# Patient Record
Sex: MALE | Race: OTHER | Employment: FULL TIME | URBAN - METROPOLITAN AREA
[De-identification: names, ages, dates, MRNs, and addresses within clinical notes are randomized per-mention and may not be internally consistent; named-entity substitution may affect disease eponyms.]

---

## 2017-05-17 RX ORDER — AMLODIPINE BESYLATE AND BENAZEPRIL HYDROCHLORIDE 5; 20 MG/1; MG/1
CAPSULE ORAL
Qty: 90 CAPSULE | Refills: 0 | Status: SHIPPED | OUTPATIENT
Start: 2017-05-17 | End: 2017-08-09

## 2017-06-19 NOTE — TELEPHONE ENCOUNTER
Patient called and stated need a new refill request  Patient is currently out of medication  Requesting a call back from nurse-    Current Outpatient Prescriptions:  AMLODIPINE BESY-BENAZEPRIL HCL 5-20 MG Oral Cap TAKE ONE CAPSULE BY MOUTH ONCE DAILY Disp:

## 2017-06-21 RX ORDER — AMLODIPINE BESYLATE AND BENAZEPRIL HYDROCHLORIDE 5; 20 MG/1; MG/1
CAPSULE ORAL
Qty: 90 CAPSULE | Refills: 1 | OUTPATIENT
Start: 2017-06-21

## 2017-06-21 NOTE — TELEPHONE ENCOUNTER
Refilled on 5/17/17 #90-picked up today. Pt contacted requested a one year supply, pended for 6 months due to annual in November. Pt will get blood work done soon has new insurance.     Hypertensive Medications  Protocol Criteria:  · Appointment scheduled

## 2017-07-26 ENCOUNTER — APPOINTMENT (OUTPATIENT)
Dept: LAB | Age: 62
End: 2017-07-26
Attending: INTERNAL MEDICINE
Payer: COMMERCIAL

## 2017-07-26 DIAGNOSIS — I10 ESSENTIAL HYPERTENSION: ICD-10-CM

## 2017-07-26 DIAGNOSIS — Z12.5 SCREENING FOR PROSTATE CANCER: ICD-10-CM

## 2017-07-26 LAB
ALBUMIN SERPL BCP-MCNC: 4.1 G/DL (ref 3.5–4.8)
ALBUMIN/GLOB SERPL: 1.6 {RATIO} (ref 1–2)
ALP SERPL-CCNC: 89 U/L (ref 32–100)
ALT SERPL-CCNC: 20 U/L (ref 17–63)
ANION GAP SERPL CALC-SCNC: 7 MMOL/L (ref 0–18)
AST SERPL-CCNC: 17 U/L (ref 15–41)
BILIRUB SERPL-MCNC: 0.5 MG/DL (ref 0.3–1.2)
BUN SERPL-MCNC: 18 MG/DL (ref 8–20)
BUN/CREAT SERPL: 17 (ref 10–20)
CALCIUM SERPL-MCNC: 9.4 MG/DL (ref 8.5–10.5)
CHLORIDE SERPL-SCNC: 107 MMOL/L (ref 95–110)
CHOLEST SERPL-MCNC: 198 MG/DL (ref 110–200)
CO2 SERPL-SCNC: 26 MMOL/L (ref 22–32)
CREAT SERPL-MCNC: 1.06 MG/DL (ref 0.5–1.5)
GLOBULIN PLAS-MCNC: 2.5 G/DL (ref 2.5–3.7)
GLUCOSE SERPL-MCNC: 109 MG/DL (ref 70–99)
HDLC SERPL-MCNC: 35 MG/DL
LDLC SERPL CALC-MCNC: 142 MG/DL (ref 0–99)
NONHDLC SERPL-MCNC: 163 MG/DL
OSMOLALITY UR CALC.SUM OF ELEC: 292 MOSM/KG (ref 275–295)
POTASSIUM SERPL-SCNC: 4.2 MMOL/L (ref 3.3–5.1)
PROT SERPL-MCNC: 6.6 G/DL (ref 5.9–8.4)
PSA SERPL-MCNC: 3.9 NG/ML (ref 0–4)
SODIUM SERPL-SCNC: 140 MMOL/L (ref 136–144)
TRIGL SERPL-MCNC: 105 MG/DL (ref 1–149)

## 2017-07-26 PROCEDURE — 80053 COMPREHEN METABOLIC PANEL: CPT

## 2017-07-26 PROCEDURE — 80061 LIPID PANEL: CPT

## 2017-07-26 PROCEDURE — 36415 COLL VENOUS BLD VENIPUNCTURE: CPT

## 2017-08-09 ENCOUNTER — TELEPHONE (OUTPATIENT)
Dept: INTERNAL MEDICINE CLINIC | Facility: CLINIC | Age: 62
End: 2017-08-09

## 2017-08-09 DIAGNOSIS — E78.5 HYPERLIPIDEMIA, UNSPECIFIED HYPERLIPIDEMIA TYPE: Primary | ICD-10-CM

## 2017-08-09 RX ORDER — AMLODIPINE BESYLATE AND BENAZEPRIL HYDROCHLORIDE 5; 20 MG/1; MG/1
CAPSULE ORAL
Qty: 90 CAPSULE | Refills: 0 | Status: SHIPPED | OUTPATIENT
Start: 2017-08-09 | End: 2017-11-06

## 2017-08-09 NOTE — TELEPHONE ENCOUNTER
Hypertensive Medications  Protocol Criteria:  · Appointment scheduled in the past 6 months or in the next 3 months  · BMP or CMP in the past 12 months  · Creatinine result < 2  Recent Outpatient Visits            8 months ago Essential hypertension    [facility]

## 2017-08-09 NOTE — TELEPHONE ENCOUNTER
----- Message from Maryann Calvert MD sent at 7/31/2017  6:00 PM CDT -----  Lipid profile mildly /moderately elevated. Suggest following stricter low-cholesterol diet and regular exercise. Recheck labs in 6 months.

## 2017-08-09 NOTE — TELEPHONE ENCOUNTER
Verified name and . Results/recommendations reviewed with pt and pt verb understanding            Bookmark Copy       ----- Message from Carol Mendoza MD sent at 2017  6:00 PM CDT -----  Lipid profile mildly /moderately elevated.   Suggest followi

## 2017-08-21 RX ORDER — AMLODIPINE BESYLATE AND BENAZEPRIL HYDROCHLORIDE 5; 20 MG/1; MG/1
CAPSULE ORAL
Qty: 90 CAPSULE | Refills: 0 | Status: SHIPPED | OUTPATIENT
Start: 2017-08-21 | End: 2017-11-06

## 2017-11-06 ENCOUNTER — OFFICE VISIT (OUTPATIENT)
Dept: INTERNAL MEDICINE CLINIC | Facility: CLINIC | Age: 62
End: 2017-11-06

## 2017-11-06 VITALS
HEART RATE: 84 BPM | SYSTOLIC BLOOD PRESSURE: 157 MMHG | WEIGHT: 222.13 LBS | DIASTOLIC BLOOD PRESSURE: 92 MMHG | BODY MASS INDEX: 35 KG/M2

## 2017-11-06 DIAGNOSIS — I10 ESSENTIAL HYPERTENSION: Primary | ICD-10-CM

## 2017-11-06 DIAGNOSIS — E78.5 HYPERLIPIDEMIA, UNSPECIFIED HYPERLIPIDEMIA TYPE: ICD-10-CM

## 2017-11-06 PROCEDURE — 99214 OFFICE O/P EST MOD 30 MIN: CPT | Performed by: INTERNAL MEDICINE

## 2017-11-06 PROCEDURE — 99212 OFFICE O/P EST SF 10 MIN: CPT | Performed by: INTERNAL MEDICINE

## 2017-11-06 RX ORDER — AMLODIPINE BESYLATE AND BENAZEPRIL HYDROCHLORIDE 5; 20 MG/1; MG/1
CAPSULE ORAL
Qty: 90 CAPSULE | Refills: 1 | Status: SHIPPED | OUTPATIENT
Start: 2017-11-06 | End: 2018-04-27

## 2017-11-06 NOTE — PROGRESS NOTES
HPI:    Patient ID: Jesus Matthews is a 63year old male. Hypertension   This is a chronic problem. The current episode started more than 1 year ago. The problem has been waxing and waning since onset. The problem is uncontrolled (BP on arrival 157/92.  Pt Oral Cap TAKE ONE CAPSULE BY MOUTH ONCE DAILY Disp: 90 capsule Rfl: 0     Allergies:No Known Allergies   PHYSICAL EXAM:   Physical Exam   Constitutional: He appears well-developed and well-nourished. No distress.    HENT:   Head: Normocephalic and atraumati PANEL      Orders Placed This Encounter      Lipid Panel [E]      Comp Metabolic Panel (14) [E]    Meds This Visit:  No prescriptions requested or ordered in this encounter    Imaging & Referrals:  None       Id#[id number]  By signing my name below, IDavid

## 2018-02-08 ENCOUNTER — OFFICE VISIT (OUTPATIENT)
Dept: PODIATRY CLINIC | Facility: CLINIC | Age: 63
End: 2018-02-08

## 2018-02-08 DIAGNOSIS — M72.2 PLANTAR FASCIITIS OF LEFT FOOT: Primary | ICD-10-CM

## 2018-02-08 PROCEDURE — 99212 OFFICE O/P EST SF 10 MIN: CPT | Performed by: PODIATRIST

## 2018-02-08 PROCEDURE — L3060 FOOT ARCH SUPP LONGITUD/META: HCPCS | Performed by: PODIATRIST

## 2018-02-08 PROCEDURE — 99243 OFF/OP CNSLTJ NEW/EST LOW 30: CPT | Performed by: PODIATRIST

## 2018-02-08 NOTE — PROGRESS NOTES
HPI:    Patient ID: Cyrus Llanos is a 63year old male. HPI  This pleasant 73-year-old male presents as a new patient to me on consult from . Patient's chief complaint is left arch pain. The pain is been present for the last 5-6 months.   The pa defined types were placed in this encounter.       Meds This Visit:  No prescriptions requested or ordered in this encounter    Imaging & Referrals:  None       #[id number]

## 2018-04-27 RX ORDER — AMLODIPINE BESYLATE AND BENAZEPRIL HYDROCHLORIDE 5; 20 MG/1; MG/1
CAPSULE ORAL
Qty: 90 CAPSULE | Refills: 0 | Status: SHIPPED | OUTPATIENT
Start: 2018-04-27 | End: 2018-08-13

## 2018-04-27 NOTE — TELEPHONE ENCOUNTER
Hypertensive Medications Protocol Passed4/27 3:45 PM   CMP or BMP in past 12 months    Serum Creatinine < 2.0    Appointment in past 6 or next 3 months     Medication refilled for 90 days per protocol.     Hypertensive Medications  Protocol Criteria:  · Reji

## 2018-07-23 RX ORDER — AMLODIPINE BESYLATE AND BENAZEPRIL HYDROCHLORIDE 5; 20 MG/1; MG/1
CAPSULE ORAL
Qty: 90 CAPSULE | Refills: 0 | OUTPATIENT
Start: 2018-07-23

## 2018-08-02 ENCOUNTER — OFFICE VISIT (OUTPATIENT)
Dept: INTERNAL MEDICINE CLINIC | Facility: CLINIC | Age: 63
End: 2018-08-02
Payer: COMMERCIAL

## 2018-08-02 VITALS
BODY MASS INDEX: 34 KG/M2 | DIASTOLIC BLOOD PRESSURE: 96 MMHG | HEART RATE: 84 BPM | SYSTOLIC BLOOD PRESSURE: 153 MMHG | WEIGHT: 218.81 LBS | TEMPERATURE: 98 F

## 2018-08-02 DIAGNOSIS — Z13.29 SCREENING FOR THYROID DISORDER: ICD-10-CM

## 2018-08-02 DIAGNOSIS — Z12.5 SCREENING FOR MALIGNANT NEOPLASM OF PROSTATE: ICD-10-CM

## 2018-08-02 DIAGNOSIS — I10 ESSENTIAL HYPERTENSION: Primary | ICD-10-CM

## 2018-08-02 DIAGNOSIS — Z13.220 SCREENING, LIPID: ICD-10-CM

## 2018-08-02 PROCEDURE — 99214 OFFICE O/P EST MOD 30 MIN: CPT | Performed by: INTERNAL MEDICINE

## 2018-08-02 PROCEDURE — 99212 OFFICE O/P EST SF 10 MIN: CPT | Performed by: INTERNAL MEDICINE

## 2018-08-02 NOTE — PROGRESS NOTES
HPI:    Patient ID: Bibi Watson is a 63year old male. Hypertension   This is a chronic problem. The current episode started more than 1 year ago. The problem has been gradually worsening since onset.  Condition status: Pt reports his systolic blood pre Constitutional: He is oriented to person, place, and time. He appears well-developed and well-nourished. No distress. HENT:   Head: Normocephalic and atraumatic. Eyes: EOM are normal.   Neck: Normal range of motion.    Cardiovascular: Normal rate, reg documentation has been prepared under the direction and in the presence of MAYI Arnold MD.   Electronically Signed: Daiana Burns, 8/2/2018, 11:18 AM.    MAYI AVILEZ MD,  personally performed the services described in this documentation.  Al

## 2018-08-13 NOTE — TELEPHONE ENCOUNTER
Pt called in to follow up on medication request and remembers during his OV Dr. Kristen Kennedy stated he was going to send a script. Pt went to the pharmacy and they did not have it for him. Please advise, Pt states that he is out of the medication now.

## 2018-08-14 RX ORDER — AMLODIPINE BESYLATE AND BENAZEPRIL HYDROCHLORIDE 5; 20 MG/1; MG/1
CAPSULE ORAL
Qty: 90 CAPSULE | Refills: 0 | Status: SHIPPED | OUTPATIENT
Start: 2018-08-14 | End: 2018-10-30

## 2018-09-10 ENCOUNTER — OFFICE VISIT (OUTPATIENT)
Dept: INTERNAL MEDICINE CLINIC | Facility: CLINIC | Age: 63
End: 2018-09-10
Payer: COMMERCIAL

## 2018-09-10 VITALS
DIASTOLIC BLOOD PRESSURE: 84 MMHG | HEIGHT: 66 IN | HEART RATE: 79 BPM | TEMPERATURE: 97 F | WEIGHT: 219.19 LBS | BODY MASS INDEX: 35.23 KG/M2 | SYSTOLIC BLOOD PRESSURE: 148 MMHG

## 2018-09-10 DIAGNOSIS — I10 ESSENTIAL HYPERTENSION: Primary | ICD-10-CM

## 2018-09-10 PROCEDURE — 99212 OFFICE O/P EST SF 10 MIN: CPT | Performed by: INTERNAL MEDICINE

## 2018-09-10 PROCEDURE — 99214 OFFICE O/P EST MOD 30 MIN: CPT | Performed by: INTERNAL MEDICINE

## 2018-09-10 NOTE — PROGRESS NOTES
HPI:    Patient ID: Rhea Castellanos is a 63year old male. Hypertension   This is a chronic problem. The current episode started more than 1 year ago. The problem has been waxing and waning since onset. The problem is controlled (on exam, /84).  Roseanne PHYSICAL EXAM:   Physical Exam   Constitutional: He is oriented to person, place, and time. He appears well-developed and well-nourished. No distress. HENT:   Head: Normocephalic and atraumatic. Eyes: Pupils are equal, round, and reactive to light. documentation. All medical record entries made by the scribe were at my direction and in my presence.   I have reviewed the chart and discharge instructions (if applicable) and agree that the record reflects my personal performance and is accurate and compl Justina Britton D.O.   [address] [address] SÁNCHEZ Castillo N.P. Sondra Regan, M.D.          [place] [id number].

## 2018-10-30 RX ORDER — AMLODIPINE BESYLATE AND BENAZEPRIL HYDROCHLORIDE 5; 20 MG/1; MG/1
CAPSULE ORAL
Qty: 90 CAPSULE | Refills: 0 | Status: SHIPPED | OUTPATIENT
Start: 2018-10-30 | End: 2019-01-19

## 2018-11-14 ENCOUNTER — APPOINTMENT (OUTPATIENT)
Dept: LAB | Age: 63
End: 2018-11-14
Attending: INTERNAL MEDICINE
Payer: COMMERCIAL

## 2018-11-14 DIAGNOSIS — I10 ESSENTIAL HYPERTENSION: ICD-10-CM

## 2018-11-14 DIAGNOSIS — Z12.5 SCREENING FOR MALIGNANT NEOPLASM OF PROSTATE: ICD-10-CM

## 2018-11-14 DIAGNOSIS — Z13.29 SCREENING FOR THYROID DISORDER: ICD-10-CM

## 2018-11-14 DIAGNOSIS — Z13.220 SCREENING, LIPID: ICD-10-CM

## 2018-11-14 PROCEDURE — 84443 ASSAY THYROID STIM HORMONE: CPT

## 2018-11-14 PROCEDURE — 80053 COMPREHEN METABOLIC PANEL: CPT

## 2018-11-14 PROCEDURE — 36415 COLL VENOUS BLD VENIPUNCTURE: CPT

## 2018-11-14 PROCEDURE — 80061 LIPID PANEL: CPT

## 2019-01-15 ENCOUNTER — OFFICE VISIT (OUTPATIENT)
Dept: INTERNAL MEDICINE CLINIC | Facility: CLINIC | Age: 64
End: 2019-01-15
Payer: COMMERCIAL

## 2019-01-15 ENCOUNTER — NURSE TRIAGE (OUTPATIENT)
Dept: OTHER | Age: 64
End: 2019-01-15

## 2019-01-15 VITALS
SYSTOLIC BLOOD PRESSURE: 134 MMHG | WEIGHT: 222.38 LBS | DIASTOLIC BLOOD PRESSURE: 80 MMHG | HEIGHT: 68 IN | HEART RATE: 101 BPM | BODY MASS INDEX: 33.7 KG/M2 | TEMPERATURE: 99 F

## 2019-01-15 DIAGNOSIS — J06.9 VIRAL UPPER RESPIRATORY TRACT INFECTION: Primary | ICD-10-CM

## 2019-01-15 PROCEDURE — 99212 OFFICE O/P EST SF 10 MIN: CPT | Performed by: INTERNAL MEDICINE

## 2019-01-15 PROCEDURE — 99214 OFFICE O/P EST MOD 30 MIN: CPT | Performed by: INTERNAL MEDICINE

## 2019-01-15 RX ORDER — FLUTICASONE PROPIONATE 50 MCG
2 SPRAY, SUSPENSION (ML) NASAL DAILY
Qty: 1 BOTTLE | Refills: 0 | Status: SHIPPED | OUTPATIENT
Start: 2019-01-15 | End: 2019-02-08

## 2019-01-15 NOTE — TELEPHONE ENCOUNTER
Action Requested: Summary for Provider     []  Critical Lab, Recommendations Needed  [] Need Additional Advice  []   FYI    []   Need Orders  [] Need Medications Sent to Pharmacy  []  Other     SUMMARY: Pt stated that over the weekend he was having a lot o

## 2019-01-15 NOTE — PROGRESS NOTES
HPI:    Patient ID: Hadley Ramos is a 66year old male. Sinusitis   This is a new problem. The current episode started in the past 7 days (3 dyas). The problem is unchanged. There has been no fever. He is experiencing no pain.  Associated symptoms includ normal and breath sounds normal. No respiratory distress. He has no wheezes. He has no rales. Lymphadenopathy:     He has no cervical adenopathy. Neurological: He is alert. Skin: No rash noted. He is not diaphoretic.               ASSESSMENT/PLAN:   (

## 2019-01-19 RX ORDER — AMLODIPINE BESYLATE AND BENAZEPRIL HYDROCHLORIDE 5; 20 MG/1; MG/1
CAPSULE ORAL
Qty: 90 CAPSULE | Refills: 0 | Status: SHIPPED | OUTPATIENT
Start: 2019-01-19 | End: 2019-03-28

## 2019-02-08 RX ORDER — FLUTICASONE PROPIONATE 50 MCG
SPRAY, SUSPENSION (ML) NASAL
Qty: 3 BOTTLE | Refills: 0 | Status: SHIPPED | OUTPATIENT
Start: 2019-02-08 | End: 2019-03-28 | Stop reason: ALTCHOICE

## 2019-03-28 ENCOUNTER — OFFICE VISIT (OUTPATIENT)
Dept: INTERNAL MEDICINE CLINIC | Facility: CLINIC | Age: 64
End: 2019-03-28
Payer: COMMERCIAL

## 2019-03-28 ENCOUNTER — NURSE TRIAGE (OUTPATIENT)
Dept: OTHER | Age: 64
End: 2019-03-28

## 2019-03-28 VITALS
DIASTOLIC BLOOD PRESSURE: 92 MMHG | SYSTOLIC BLOOD PRESSURE: 145 MMHG | WEIGHT: 215.13 LBS | HEART RATE: 92 BPM | BODY MASS INDEX: 33 KG/M2 | TEMPERATURE: 98 F

## 2019-03-28 DIAGNOSIS — I10 ESSENTIAL HYPERTENSION: ICD-10-CM

## 2019-03-28 DIAGNOSIS — K52.9 ACUTE GASTROENTERITIS: Primary | ICD-10-CM

## 2019-03-28 PROCEDURE — 99214 OFFICE O/P EST MOD 30 MIN: CPT | Performed by: INTERNAL MEDICINE

## 2019-03-28 PROCEDURE — 99212 OFFICE O/P EST SF 10 MIN: CPT | Performed by: INTERNAL MEDICINE

## 2019-03-28 RX ORDER — AMLODIPINE BESYLATE AND BENAZEPRIL HYDROCHLORIDE 5; 20 MG/1; MG/1
1 CAPSULE ORAL
Qty: 90 CAPSULE | Refills: 0 | Status: SHIPPED | OUTPATIENT
Start: 2019-03-28 | End: 2019-12-26

## 2019-03-28 NOTE — PROGRESS NOTES
HPI:    Patient ID: Dmitri Pereira is a 66year old male. Patient presents with:  Diarrhea: c/o chronic diarrhea x 3 days    HPI  Diarrhea  Patient c/o watery diarrhea, frequent BM (every 2-3 hours during daytime/nighttime), and abdominal pain.  This is a ne Diagnosis Date   • Essential hypertension       History reviewed. No pertinent surgical history. History reviewed. No pertinent family history.    Social History    Tobacco Use      Smoking status: Light Tobacco Smoker        Types: Cigars      Smokeles reviewed. 03/28/19  1518   BP: (!) 145/92   Pulse: 92   Temp: 98.4 °F (36.9 °C)   TempSrc: Tympanic   Weight: 215 lb 1.6 oz (97.6 kg)     Body mass index is 32.71 kg/m².   Diabetes:  No results found for: A1C, EAG  Lab Results   Component Value Date Besy-Benazepril HCl 5-20 MG Oral Cap 90 capsule 0     Sig: Take 1 capsule by mouth once daily.        Imaging & Referrals:  None       ID#[id number]  By signing my name below, Armida Travis,  attest that this documentation has been prepared under the direct

## 2019-03-28 NOTE — TELEPHONE ENCOUNTER
Action Requested: Summary for Provider     []  Critical Lab, Recommendations Needed  [] Need Additional Advice  []   FYI    []   Need Orders  [] Need Medications Sent to Pharmacy  []  Other     SUMMARY: patient called stating,\"I went out to dinner on Sund

## 2019-04-18 RX ORDER — AMLODIPINE BESYLATE AND BENAZEPRIL HYDROCHLORIDE 5; 20 MG/1; MG/1
CAPSULE ORAL
Qty: 90 CAPSULE | Refills: 0 | Status: SHIPPED | OUTPATIENT
Start: 2019-04-18 | End: 2019-07-19

## 2019-07-19 RX ORDER — AMLODIPINE BESYLATE AND BENAZEPRIL HYDROCHLORIDE 5; 20 MG/1; MG/1
1 CAPSULE ORAL
Qty: 90 CAPSULE | Refills: 1 | Status: SHIPPED | OUTPATIENT
Start: 2019-07-19 | End: 2019-11-26

## 2019-07-19 NOTE — TELEPHONE ENCOUNTER
Refill passed per [facility] protocol.   Hypertensive Medications  Protocol Criteria:  · Appointment scheduled in the past 6 months or in the next 3 months  · BMP or CMP in the past 12 months  · Creatinine result < 2  Recent Outpatient Visits

## 2019-07-19 NOTE — TELEPHONE ENCOUNTER
Pharmacy called to request refill for     Current Outpatient Medications:  AMLODIPINE BESY-BENAZEPRIL HCL 5-20 MG Oral Cap TAKE 1 CAPSULE BY MOUTH EVERY DAY Disp: 90 capsule Rfl: 0

## 2019-11-26 ENCOUNTER — OFFICE VISIT (OUTPATIENT)
Dept: INTERNAL MEDICINE CLINIC | Facility: CLINIC | Age: 64
End: 2019-11-26
Payer: COMMERCIAL

## 2019-11-26 VITALS
HEIGHT: 66 IN | RESPIRATION RATE: 12 BRPM | HEART RATE: 89 BPM | DIASTOLIC BLOOD PRESSURE: 85 MMHG | TEMPERATURE: 98 F | WEIGHT: 222 LBS | SYSTOLIC BLOOD PRESSURE: 172 MMHG | BODY MASS INDEX: 35.68 KG/M2

## 2019-11-26 DIAGNOSIS — I10 ESSENTIAL HYPERTENSION: ICD-10-CM

## 2019-11-26 DIAGNOSIS — R01.1 HEART MURMUR: ICD-10-CM

## 2019-11-26 DIAGNOSIS — Z12.11 COLON CANCER SCREENING: ICD-10-CM

## 2019-11-26 DIAGNOSIS — Z00.00 ANNUAL PHYSICAL EXAM: Primary | ICD-10-CM

## 2019-11-26 PROBLEM — J06.9 VIRAL UPPER RESPIRATORY TRACT INFECTION: Status: RESOLVED | Noted: 2019-01-15 | Resolved: 2019-11-26

## 2019-11-26 PROCEDURE — 99396 PREV VISIT EST AGE 40-64: CPT | Performed by: INTERNAL MEDICINE

## 2019-11-26 RX ORDER — HYDROCHLOROTHIAZIDE 12.5 MG/1
12.5 TABLET ORAL DAILY
Qty: 90 TABLET | Refills: 0 | Status: SHIPPED | OUTPATIENT
Start: 2019-11-26

## 2019-11-26 RX ORDER — AMLODIPINE BESYLATE AND BENAZEPRIL HYDROCHLORIDE 5; 20 MG/1; MG/1
1 CAPSULE ORAL
Qty: 90 CAPSULE | Refills: 1 | Status: SHIPPED | OUTPATIENT
Start: 2019-11-26 | End: 2020-05-30

## 2019-11-26 NOTE — PROGRESS NOTES
History of Present Illness   Patient ID: Jesus Matthews is a 66year old male.   Chief Complaint: Establish Care (Former Dr. Helen Hall patient. ) and HTN (f/u HTN)      Patient presents today to get established with me as his PCP since Dr Tariq Baker PCP had r HENT:   Right Ear: Tympanic membrane and external ear normal.   Left Ear: Tympanic membrane and external ear normal.   Nose: Nose normal.   Mouth/Throat: Oropharynx is clear and moist. No oropharyngeal exudate.    Eyes: Pupils are equal, round, and reacti CHOLEST 223 (H) 11/14/2018    TRIG 158 (H) 11/14/2018    HDL 38 11/14/2018     (H) 11/14/2018    NONHDLC 185 (H) 11/14/2018     The 10-year ASCVD risk score (Sage Clancy, et al., 2013) is: 23.1%    Values used to calculate the score:      Age: 66 Colon cancer screening  Plan: GASTRO - INTERNAL       Pt overdue for colon screening; he opted for colonoscopy so referred to gastro    (I10) Essential hypertension  Plan: bp had been elevated inspite of being on lotrel 5/20mg daily.  I told him will [facility].

## 2019-12-23 ENCOUNTER — OFFICE VISIT (OUTPATIENT)
Dept: INTERNAL MEDICINE CLINIC | Facility: CLINIC | Age: 64
End: 2019-12-23
Payer: COMMERCIAL

## 2019-12-23 ENCOUNTER — OFFICE VISIT (OUTPATIENT)
Dept: OTOLARYNGOLOGY | Facility: CLINIC | Age: 64
End: 2019-12-23
Payer: COMMERCIAL

## 2019-12-23 ENCOUNTER — NURSE TRIAGE (OUTPATIENT)
Dept: INTERNAL MEDICINE CLINIC | Facility: CLINIC | Age: 64
End: 2019-12-23

## 2019-12-23 VITALS — SYSTOLIC BLOOD PRESSURE: 167 MMHG | DIASTOLIC BLOOD PRESSURE: 97 MMHG | TEMPERATURE: 97 F | HEART RATE: 91 BPM

## 2019-12-23 VITALS
HEART RATE: 98 BPM | SYSTOLIC BLOOD PRESSURE: 180 MMHG | WEIGHT: 215 LBS | HEIGHT: 66 IN | DIASTOLIC BLOOD PRESSURE: 102 MMHG | BODY MASS INDEX: 34.55 KG/M2

## 2019-12-23 DIAGNOSIS — H92.02 ACUTE EAR PAIN, LEFT: ICD-10-CM

## 2019-12-23 DIAGNOSIS — H61.23 BILATERAL IMPACTED CERUMEN: Primary | ICD-10-CM

## 2019-12-23 DIAGNOSIS — H60.332 ACUTE SWIMMER'S EAR OF LEFT SIDE: ICD-10-CM

## 2019-12-23 DIAGNOSIS — H61.22 IMPACTED CERUMEN OF LEFT EAR: Primary | ICD-10-CM

## 2019-12-23 PROCEDURE — 99243 OFF/OP CNSLTJ NEW/EST LOW 30: CPT | Performed by: OTOLARYNGOLOGY

## 2019-12-23 PROCEDURE — 69210 REMOVE IMPACTED EAR WAX UNI: CPT | Performed by: OTOLARYNGOLOGY

## 2019-12-23 PROCEDURE — 69209 REMOVE IMPACTED EAR WAX UNI: CPT | Performed by: INTERNAL MEDICINE

## 2019-12-23 PROCEDURE — 99213 OFFICE O/P EST LOW 20 MIN: CPT | Performed by: INTERNAL MEDICINE

## 2019-12-23 RX ORDER — NEOMYCIN SULFATE, POLYMYXIN B SULFATE AND HYDROCORTISONE 10; 3.5; 1 MG/ML; MG/ML; [USP'U]/ML
3 SUSPENSION/ DROPS AURICULAR (OTIC) 3 TIMES DAILY
Qty: 10 ML | Refills: 1 | Status: SHIPPED | OUTPATIENT
Start: 2019-12-23 | End: 2019-12-26

## 2019-12-23 RX ORDER — AMOXICILLIN AND CLAVULANATE POTASSIUM 875; 125 MG/1; MG/1
1 TABLET, FILM COATED ORAL 2 TIMES DAILY
Qty: 20 TABLET | Refills: 0 | Status: SHIPPED | OUTPATIENT
Start: 2019-12-23 | End: 2020-01-02

## 2019-12-23 NOTE — PROGRESS NOTES
Yoel Steward is a 60year old male. Patient presents with:  Ear Wax: Left ear \"clogged\", pt bought ear waxing kit, nothing came out. Pt placed cotton ball in ear and started bleeding minimally.  Pt went to PCP who tried to remove ear wax, and nothing came Attends Yarsani service: Not on file        Active member of club or organization: Not on file        Attends meetings of clubs or organizations: Not on file        Relationship status: Not on file      Intimate partner violence:        Fear of current o No suspicious lesions bruises or masses.    Constitutional Normal Overall appearance - Normal.   Head/Face Normal Facial features - Normal. Eyebrows - Normal. Skull - Normal.   Nasopharynx Normal External nose - Normal.  .   Neurological Normal Memory - Nor REQUIRING INSTRUMENTATION, UNILATERAL    2. Acute swimmer's ear of left side  Pathophysiology of external otitis was discussed at length. Relevant anatomy was shown to the patient on the picture of the ear in the examination room.  I discussed the fact that

## 2019-12-23 NOTE — PROGRESS NOTES
History of Present Illness   Patient ID: Allen Siemens is a 60year old male. Chief Complaint: Ear Wax (left ear. tried OTC ear wax removal kit not helping. did see blood on cotton ball and cutip. )      Ear Wax   This is a new problem.  Episode onset: thcj Psychiatric: He has a normal mood and affect. Procedure note: left ear irrigated with warm water. Small fragments of wax removed but unable to visualize tympanic membrane with otoscope in office.      Labs & Imaging  Pertinent labs and imaging reviewe Tobacco comment: quit smoking more than 30 yrs    Alcohol use: Yes      Alcohol/week: 0.0 standard drinks      Comment: Rarely.     Drug use: No     Reviewed:    Current Outpatient Medications:   •  Amoxicillin-Pot Clavulanate 875-125 MG Oral Tab, Take 1 ta education discussed with patient as per after visit summary. Potential medication side effects discussed. All questions answered to best of ability. Call office with any questions. Seek emergency care if necessary.    Patient understands and agrees to fol

## 2019-12-23 NOTE — TELEPHONE ENCOUNTER
Action Requested: Summary for Provider     []  Critical Lab, Recommendations Needed  [] Need Additional Advice  []   FYI    []   Need Orders  [] Need Medications Sent to Pharmacy  []  Other     SUMMARY: Spoke with the patient who reports since Thursday 12/

## 2019-12-26 ENCOUNTER — OFFICE VISIT (OUTPATIENT)
Dept: OTOLARYNGOLOGY | Facility: CLINIC | Age: 64
End: 2019-12-26
Payer: COMMERCIAL

## 2019-12-26 VITALS
WEIGHT: 220 LBS | BODY MASS INDEX: 34.53 KG/M2 | DIASTOLIC BLOOD PRESSURE: 96 MMHG | TEMPERATURE: 98 F | SYSTOLIC BLOOD PRESSURE: 168 MMHG | HEIGHT: 67 IN

## 2019-12-26 DIAGNOSIS — H60.332 ACUTE SWIMMER'S EAR OF LEFT SIDE: Primary | ICD-10-CM

## 2019-12-26 PROCEDURE — 99213 OFFICE O/P EST LOW 20 MIN: CPT | Performed by: OTOLARYNGOLOGY

## 2019-12-26 PROCEDURE — 92504 EAR MICROSCOPY EXAMINATION: CPT | Performed by: OTOLARYNGOLOGY

## 2019-12-26 RX ORDER — NEOMYCIN SULFATE, POLYMYXIN B SULFATE AND HYDROCORTISONE 10; 3.5; 1 MG/ML; MG/ML; [USP'U]/ML
3 SUSPENSION/ DROPS AURICULAR (OTIC) DAILY
Qty: 1 BOTTLE | Refills: 1 | Status: SHIPPED | OUTPATIENT
Start: 2019-12-26 | End: 2020-01-05

## 2019-12-26 NOTE — PROGRESS NOTES
Dmitri Pereira is a 60year old male. Patient presents with: Follow - Up: regarding acute swimmer's ear of left side, c/o clogged left ear       HISTORY OF PRESENT ILLNESS  12/23/2019  Patient  presents with ear pain in the left ears for4 days.   He felt lashay Attends Buddhism service: Not on file        Active member of club or organization: Not on file        Attends meetings of clubs or organizations: Not on file        Relationship status: Not on file      Intimate partner violence:        Fear of curr Normal Inspection - Normal. No suspicious lesions bruises or masses. Constitutional Normal Overall appearance - Normal.   Head/Face Normal Facial features - Normal. Eyebrows - Normal. Skull - Normal.   Nasopharynx Normal External nose - Normal.  .    Ileana Barrett patient on the picture of the ear in the examination room. I discussed the fact that usually this is related to water exposure of the external auditory canal followed by bacterial overgrowth. I emphasized the need to keep water out of the ear.   An otowick

## 2019-12-28 ENCOUNTER — OFFICE VISIT (OUTPATIENT)
Dept: OTOLARYNGOLOGY | Facility: CLINIC | Age: 64
End: 2019-12-28
Payer: COMMERCIAL

## 2019-12-28 ENCOUNTER — TELEPHONE (OUTPATIENT)
Dept: OTOLARYNGOLOGY | Facility: CLINIC | Age: 64
End: 2019-12-28

## 2019-12-28 VITALS
SYSTOLIC BLOOD PRESSURE: 169 MMHG | BODY MASS INDEX: 34.53 KG/M2 | TEMPERATURE: 98 F | WEIGHT: 220 LBS | HEIGHT: 67 IN | DIASTOLIC BLOOD PRESSURE: 93 MMHG

## 2019-12-28 DIAGNOSIS — H62.40 FUNGAL OTITIS EXTERNA: Primary | ICD-10-CM

## 2019-12-28 DIAGNOSIS — B36.9 FUNGAL OTITIS EXTERNA: Primary | ICD-10-CM

## 2019-12-28 PROCEDURE — 92504 EAR MICROSCOPY EXAMINATION: CPT | Performed by: OTOLARYNGOLOGY

## 2019-12-28 PROCEDURE — 99213 OFFICE O/P EST LOW 20 MIN: CPT | Performed by: OTOLARYNGOLOGY

## 2019-12-28 RX ORDER — CLOTRIMAZOLE 1 G/ML
SOLUTION TOPICAL
Qty: 1 BOTTLE | Refills: 0 | Status: SHIPPED | OUTPATIENT
Start: 2019-12-28 | End: 2020-01-27

## 2019-12-28 NOTE — TELEPHONE ENCOUNTER
Per Dr. Lyric Schneider, ok to have pt come in today. Spoke with pt who states he will come in 10:30-10:45.

## 2019-12-28 NOTE — TELEPHONE ENCOUNTER
Pt. states that cotton that was inserted into his L ear has not fallen out, and it is recessing into his ear. Pt. States that Dr Naresh Siddiqui inserted the cotton on 12/26/19. Pt wants to know what should he do?

## 2019-12-28 NOTE — TELEPHONE ENCOUNTER
Dr. Justina Christopher please advise,   Saeed Perkins placed in left ear by Dr. Karyle Alice 12/26/19. Pt using ear drops and was feeling better, but pt states the otowick is hurting badly and not falling out, it's going deeper into ear and feeling like it's compressing.  Pt state

## 2019-12-28 NOTE — PROGRESS NOTES
Moses Royal is a 60year old male.   Patient presents with:  Ear Problem: pt states left ear throbbing pain on and off, left ear feels clogged      HISTORY OF PRESENT ILLNESS    Seen by Dr. Shook Second occasions for acute otitis externa most recently had a Hema/Lymph Negative Easy bleeding and easy bruising.            PHYSICAL EXAM    BP (!) 169/93 (BP Location: Left arm, Patient Position: Sitting, Cuff Size: large)   Temp 97.8 °F (36.6 °C) (Tympanic)   Ht 5' 7\" (1.702 m)   Wt 220 lb (99.8 kg)   BMI 34.46 Oral Tab, Take 1 tablet (12.5 mg total) by mouth daily. , Disp: 90 tablet, Rfl: 0  •  amLODIPine Besy-Benazepril HCl 5-20 MG Oral Cap, Take 1 capsule by mouth once daily. , Disp: 90 capsule, Rfl: 1  •  Neomycin-Polymyxin-HC [id number] Otic Suspension, Place

## 2020-01-02 ENCOUNTER — OFFICE VISIT (OUTPATIENT)
Dept: OTOLARYNGOLOGY | Facility: CLINIC | Age: 65
End: 2020-01-02
Payer: COMMERCIAL

## 2020-01-02 VITALS
TEMPERATURE: 98 F | HEIGHT: 67 IN | SYSTOLIC BLOOD PRESSURE: 177 MMHG | WEIGHT: 220 LBS | BODY MASS INDEX: 34.53 KG/M2 | DIASTOLIC BLOOD PRESSURE: 99 MMHG

## 2020-01-02 DIAGNOSIS — H62.40 FUNGAL OTITIS EXTERNA: Primary | ICD-10-CM

## 2020-01-02 DIAGNOSIS — B36.9 FUNGAL OTITIS EXTERNA: Primary | ICD-10-CM

## 2020-01-02 PROCEDURE — 92504 EAR MICROSCOPY EXAMINATION: CPT | Performed by: OTOLARYNGOLOGY

## 2020-01-02 PROCEDURE — 99213 OFFICE O/P EST LOW 20 MIN: CPT | Performed by: OTOLARYNGOLOGY

## 2020-01-02 RX ORDER — FLUCONAZOLE 100 MG/1
100 TABLET ORAL DAILY
Qty: 7 TABLET | Refills: 0 | Status: SHIPPED | OUTPATIENT
Start: 2020-01-02

## 2020-01-02 NOTE — PROGRESS NOTES
Rosanne Lozada is a 60year old male. Patient presents with:   Follow - Up: regarding fungal otitis externa, pt states symptoms returned 2 days ago       HISTORY OF PRESENT ILLNESS  Seen by Dr. Roxane Toth  on several occasions for acute otitis externa most recently changes. Respiratory Negative Dyspnea and wheezing. Cardio Negative Chest pain, irregular heartbeat/palpitations and syncope. GI Negative Abdominal pain and diarrhea. Endocrine Negative Cold intolerance and heat intolerance.    Neuro Negative Tremor all debris removed using suction. The findings are described in the physical exam.   All fungal debris removed from the left ear canal.  Multiple irrigations with acetic acid.     Current Outpatient Medications:   •  fluconazole 100 MG Oral Tab, Take 1 tabl

## 2020-01-04 ENCOUNTER — OFFICE VISIT (OUTPATIENT)
Dept: OTOLARYNGOLOGY | Facility: CLINIC | Age: 65
End: 2020-01-04
Payer: COMMERCIAL

## 2020-01-04 VITALS
WEIGHT: 220 LBS | HEIGHT: 67 IN | BODY MASS INDEX: 34.53 KG/M2 | TEMPERATURE: 98 F | SYSTOLIC BLOOD PRESSURE: 150 MMHG | DIASTOLIC BLOOD PRESSURE: 85 MMHG

## 2020-01-04 DIAGNOSIS — H62.40 FUNGAL OTITIS EXTERNA: Primary | ICD-10-CM

## 2020-01-04 DIAGNOSIS — B36.9 FUNGAL OTITIS EXTERNA: Primary | ICD-10-CM

## 2020-01-04 PROCEDURE — 99213 OFFICE O/P EST LOW 20 MIN: CPT | Performed by: OTOLARYNGOLOGY

## 2020-01-04 NOTE — PROGRESS NOTES
[facility] is a 60year old male. Patient presents with:   Follow - Up: regarding fungal otitis externa- per pt there is some changes/improvement of symptoms      HISTORY OF PRESENT ILLNESS  Seen by Dr. Nadeen Gavin  on several occasions for acute otitis externa Procedure Laterality Date   • COLONOSCOPY           REVIEW OF SYSTEMS    System Neg/Pos Details   Constitutional Negative Fatigue, fever and weight loss. ENMT Negative Drooling. Eyes Negative Blurred vision and vision changes.    Respiratory Negative Normal Nares - Right: Normal Left: Normal. Septum -Normal  Turbinates - Right: Normal, Left: Normal.       Current Outpatient Medications:   •  fluconazole 100 MG Oral Tab, Take 1 tablet (100 mg total) by mouth daily. , Disp: 7 tablet, Rfl: 0  •  clotrimazo

## 2020-01-14 ENCOUNTER — OFFICE VISIT (OUTPATIENT)
Dept: OTOLARYNGOLOGY | Facility: CLINIC | Age: 65
End: 2020-01-14
Payer: COMMERCIAL

## 2020-01-14 VITALS
TEMPERATURE: 98 F | HEIGHT: 67 IN | BODY MASS INDEX: 34.53 KG/M2 | SYSTOLIC BLOOD PRESSURE: 154 MMHG | WEIGHT: 220 LBS | DIASTOLIC BLOOD PRESSURE: 91 MMHG

## 2020-01-14 DIAGNOSIS — H62.40 FUNGAL OTITIS EXTERNA: Primary | ICD-10-CM

## 2020-01-14 DIAGNOSIS — B36.9 FUNGAL OTITIS EXTERNA: Primary | ICD-10-CM

## 2020-01-14 PROCEDURE — 99213 OFFICE O/P EST LOW 20 MIN: CPT | Performed by: OTOLARYNGOLOGY

## 2020-01-14 NOTE — PROGRESS NOTES
Agustín Nance is a 60year old male. Patient presents with:   Follow - Up: regarding fungal otitis externa, improvement in symptoms       HISTORY OF PRESENT ILLNESS    Seen by Adam Da Silva several occasions for acute otitis externa most recently had odette persaud Problem Relation Age of Onset   • Stroke Mother    • Heart Disorder Mother        Past Medical History:   Diagnosis Date   • Essential hypertension        Past Surgical History:   Procedure Laterality Date   • COLONOSCOPY           REVIEW OF SYSTEMS    S Supraclavicular.         Nose/Mouth/Throat Normal External nose - Normal. Lips/teeth/gums - Normal. Tonsils - Normal. Oropharynx - Normal.   Nose/Mouth/Throat Normal Nares - Right: Normal Left: Normal. Septum -Normal  Turbinates - Right: Normal, Left: Eric Garcia

## 2020-05-29 ENCOUNTER — TELEPHONE (OUTPATIENT)
Dept: INTERNAL MEDICINE CLINIC | Facility: CLINIC | Age: 65
End: 2020-05-29

## 2020-05-29 NOTE — TELEPHONE ENCOUNTER
Patient is calling to let the doctor know the reason he has not got his labs done is because of the virus. He plans on scheduling something end of July.

## 2020-05-30 RX ORDER — AMLODIPINE BESYLATE AND BENAZEPRIL HYDROCHLORIDE 5; 20 MG/1; MG/1
CAPSULE ORAL
Qty: 90 CAPSULE | Refills: 0 | Status: SHIPPED | OUTPATIENT
Start: 2020-05-30 | End: 2020-07-13

## 2020-06-05 NOTE — TELEPHONE ENCOUNTER
RODO patient and related and related Dr. Natalia Goff message that patient that is due for f/u HTN.  Appointment scheduled for 7/14/2020

## 2020-07-13 ENCOUNTER — OFFICE VISIT (OUTPATIENT)
Dept: INTERNAL MEDICINE CLINIC | Facility: CLINIC | Age: 65
End: 2020-07-13
Payer: COMMERCIAL

## 2020-07-13 VITALS
SYSTOLIC BLOOD PRESSURE: 153 MMHG | WEIGHT: 198 LBS | TEMPERATURE: 98 F | BODY MASS INDEX: 31.08 KG/M2 | HEIGHT: 67 IN | HEART RATE: 86 BPM | RESPIRATION RATE: 12 BRPM | DIASTOLIC BLOOD PRESSURE: 97 MMHG

## 2020-07-13 DIAGNOSIS — R01.1 HEART MURMUR: ICD-10-CM

## 2020-07-13 DIAGNOSIS — I10 ESSENTIAL HYPERTENSION: Primary | ICD-10-CM

## 2020-07-13 DIAGNOSIS — E78.00 HYPERCHOLESTEREMIA: ICD-10-CM

## 2020-07-13 DIAGNOSIS — Z12.11 COLON CANCER SCREENING: ICD-10-CM

## 2020-07-13 PROCEDURE — 99214 OFFICE O/P EST MOD 30 MIN: CPT | Performed by: INTERNAL MEDICINE

## 2020-07-13 RX ORDER — AMLODIPINE BESYLATE AND BENAZEPRIL HYDROCHLORIDE 5; 20 MG/1; MG/1
1 CAPSULE ORAL DAILY
Qty: 90 CAPSULE | Refills: 1 | Status: SHIPPED | OUTPATIENT
Start: 2020-07-13 | End: 2020-08-26

## 2020-07-13 NOTE — PROGRESS NOTES
HPI:    Patient ID: Sujatha Sharpe is a 60year old male. Hypertension   This is a chronic problem. The current episode started more than 1 year ago. The problem has been waxing and waning since onset. The problem is uncontrolled.  Pertinent negatives incl Tab Take 1 tablet (12.5 mg total) by mouth daily. (Patient not taking: Reported on 7/13/2020 ) 90 tablet 0     Allergies:No Known Allergies   PHYSICAL EXAM:   Physical Exam   Constitutional: He is oriented to person, place, and time.  He appears well-develo types were placed in this encounter.       Meds This Visit:  Requested Prescriptions      No prescriptions requested or ordered in this encounter       Imaging & Referrals:  None       #[id number]

## 2020-07-14 ENCOUNTER — OFFICE VISIT (OUTPATIENT)
Dept: OTOLARYNGOLOGY | Facility: CLINIC | Age: 65
End: 2020-07-14
Payer: COMMERCIAL

## 2020-07-14 VITALS
HEIGHT: 67 IN | SYSTOLIC BLOOD PRESSURE: 146 MMHG | WEIGHT: 198 LBS | BODY MASS INDEX: 31.08 KG/M2 | HEART RATE: 75 BPM | DIASTOLIC BLOOD PRESSURE: 86 MMHG

## 2020-07-14 DIAGNOSIS — H61.23 BILATERAL IMPACTED CERUMEN: Primary | ICD-10-CM

## 2020-07-14 PROCEDURE — 69210 REMOVE IMPACTED EAR WAX UNI: CPT | Performed by: OTOLARYNGOLOGY

## 2020-07-14 NOTE — PROGRESS NOTES
Andreina Rhodes is a 60year old male.   Patient presents with:  Ear Problem: f/u fungal otitis externa - per pt is not having any sympomts       HISTORY OF PRESENT ILLNESS  Seen by Gilford Spencer several occasions for acute otitis externa most recently had a wic Concerns:        Caffeine Concern: Yes          Coffee, 2 cups daily.       Family History   Problem Relation Age of Onset   • Stroke Mother    • Heart Disorder Mother        Past Medical History:   Diagnosis Date   • Essential hypertension        Past S suction. Comments: Return to clinic as needed. Avoid q-tips, water precautions and use over the counter wax remedies as needed. Current Outpatient Medications:   •  amLODIPine Besy-Benazepril HCl 5-20 MG Oral Cap, Take 1 capsule by mouth daily. Petey Loud

## 2020-08-26 ENCOUNTER — TELEPHONE (OUTPATIENT)
Dept: INTERNAL MEDICINE CLINIC | Facility: CLINIC | Age: 65
End: 2020-08-26

## 2020-08-26 RX ORDER — AMLODIPINE BESYLATE AND BENAZEPRIL HYDROCHLORIDE 5; 20 MG/1; MG/1
1 CAPSULE ORAL DAILY
Qty: 90 CAPSULE | Refills: 1 | Status: SHIPPED | OUTPATIENT
Start: 2020-08-26 | End: 2021-08-10

## 2020-08-26 NOTE — TELEPHONE ENCOUNTER
Per pharmacy pt is requesting refill for the following medication. •  amLODIPine Besy-Benazepril HCl 5-20 MG Oral Cap, Take 1 capsule by mouth daily. , Disp: 90 capsule, Rfl: 1
alert and oriented x 3

## 2021-03-11 ENCOUNTER — TELEPHONE (OUTPATIENT)
Dept: INTERNAL MEDICINE CLINIC | Facility: CLINIC | Age: 66
End: 2021-03-11

## 2021-03-11 NOTE — TELEPHONE ENCOUNTER
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 Patient would like to know if this is true and safe for him to obtain COVID vaccine.

## 2021-03-11 NOTE — TELEPHONE ENCOUNTER
There really has been no direct link of bells palsy from covid vaccines. He can also go to the [facility] website for covid vaccinations and he will be able to see it there.    Even for patients who had bells palsy before, CDC didn't not put his as a contraindicat

## 2021-03-30 ENCOUNTER — IMMUNIZATION (OUTPATIENT)
Dept: LAB | Facility: HOSPITAL | Age: 66
End: 2021-03-30
Attending: HOSPITALIST
Payer: COMMERCIAL

## 2021-03-30 DIAGNOSIS — Z23 NEED FOR VACCINATION: Primary | ICD-10-CM

## 2021-03-30 PROCEDURE — 0011A SARSCOV2 VAC 100MCG/0.5ML IM: CPT

## 2021-04-27 ENCOUNTER — IMMUNIZATION (OUTPATIENT)
Dept: LAB | Facility: HOSPITAL | Age: 66
End: 2021-04-27
Attending: EMERGENCY MEDICINE
Payer: COMMERCIAL

## 2021-04-27 DIAGNOSIS — Z23 NEED FOR VACCINATION: Primary | ICD-10-CM

## 2021-04-27 PROCEDURE — 0012A SARSCOV2 VAC 100MCG/0.5ML IM: CPT

## 2021-06-01 ENCOUNTER — OFFICE VISIT (OUTPATIENT)
Dept: OTOLARYNGOLOGY | Facility: CLINIC | Age: 66
End: 2021-06-01
Payer: COMMERCIAL

## 2021-06-01 VITALS
DIASTOLIC BLOOD PRESSURE: 79 MMHG | TEMPERATURE: 98 F | WEIGHT: 190 LBS | SYSTOLIC BLOOD PRESSURE: 142 MMHG | BODY MASS INDEX: 29.82 KG/M2 | HEIGHT: 67 IN

## 2021-06-01 DIAGNOSIS — H61.23 BILATERAL IMPACTED CERUMEN: Primary | ICD-10-CM

## 2021-06-01 PROCEDURE — 3008F BODY MASS INDEX DOCD: CPT | Performed by: OTOLARYNGOLOGY

## 2021-06-01 PROCEDURE — 3078F DIAST BP <80 MM HG: CPT | Performed by: OTOLARYNGOLOGY

## 2021-06-01 PROCEDURE — 69210 REMOVE IMPACTED EAR WAX UNI: CPT | Performed by: OTOLARYNGOLOGY

## 2021-06-01 PROCEDURE — 3077F SYST BP >= 140 MM HG: CPT | Performed by: OTOLARYNGOLOGY

## 2021-06-01 NOTE — PROGRESS NOTES
Yarelis Smith is a 63year old male. Patient presents with: Follow - Up: pt presents today for 6 month f/u due to wax on both ears and cleaning on both ears.        HISTORY OF PRESENT ILLNESS    Patient presents for cerumen removal. No other complaints or 190 lb (86.2 kg)   BMI 29.76 kg/m²        Constitutional Normal Overall appearance - Normal.        Neck Exam Normal Inspection - Normal. Palpation - Normal. Parotid gland - Normal. Thyroid gland - Normal.             Head/Face Normal Facial features - Nor

## 2021-08-10 RX ORDER — AMLODIPINE BESYLATE AND BENAZEPRIL HYDROCHLORIDE 5; 20 MG/1; MG/1
1 CAPSULE ORAL DAILY
Qty: 90 CAPSULE | Refills: 0 | Status: SHIPPED | OUTPATIENT
Start: 2021-08-10 | End: 2021-11-04

## 2021-08-10 NOTE — TELEPHONE ENCOUNTER
Current Outpatient Medications:   •  amLODIPine Besy-Benazepril HCl 5-20 MG Oral Cap, Take 1 capsule by mouth daily. , Disp: 90 capsule, Rfl: 1

## 2021-08-11 NOTE — TELEPHONE ENCOUNTER
pls call pt, pt has not been seen for more than a year.  He needs [facility] ov for me to continue to refill his med

## 2021-08-11 NOTE — TELEPHONE ENCOUNTER
Call Center, please assist patient to schedule OV for f/u HTN in order to received future med refills. See Dr. Monaco Flight message below. Thank you.

## 2021-11-04 ENCOUNTER — OFFICE VISIT (OUTPATIENT)
Dept: INTERNAL MEDICINE CLINIC | Facility: CLINIC | Age: 66
End: 2021-11-04
Payer: COMMERCIAL

## 2021-11-04 VITALS
WEIGHT: 193.38 LBS | HEART RATE: 82 BPM | HEIGHT: 67 IN | DIASTOLIC BLOOD PRESSURE: 86 MMHG | BODY MASS INDEX: 30.35 KG/M2 | TEMPERATURE: 99 F | SYSTOLIC BLOOD PRESSURE: 134 MMHG | OXYGEN SATURATION: 99 %

## 2021-11-04 DIAGNOSIS — Z12.11 COLON CANCER SCREENING: ICD-10-CM

## 2021-11-04 DIAGNOSIS — I10 PRIMARY HYPERTENSION: ICD-10-CM

## 2021-11-04 DIAGNOSIS — Z00.00 ANNUAL PHYSICAL EXAM: Primary | ICD-10-CM

## 2021-11-04 PROCEDURE — 3079F DIAST BP 80-89 MM HG: CPT | Performed by: INTERNAL MEDICINE

## 2021-11-04 PROCEDURE — 3008F BODY MASS INDEX DOCD: CPT | Performed by: INTERNAL MEDICINE

## 2021-11-04 PROCEDURE — 3075F SYST BP GE 130 - 139MM HG: CPT | Performed by: INTERNAL MEDICINE

## 2021-11-04 PROCEDURE — 99396 PREV VISIT EST AGE 40-64: CPT | Performed by: INTERNAL MEDICINE

## 2021-11-04 RX ORDER — AMLODIPINE BESYLATE AND BENAZEPRIL HYDROCHLORIDE 5; 20 MG/1; MG/1
1 CAPSULE ORAL DAILY
Qty: 90 CAPSULE | Refills: 1 | Status: SHIPPED | OUTPATIENT
Start: 2021-11-04

## 2021-11-04 NOTE — PROGRESS NOTES
Subjective:     Patient ID: Brittnee Calderon is a 63year old male. Patient presents today for annual physical and ffup for his hypertension.  He states he was not able to tolerate hydrochlorthiazide 12.5mg daily I added before to control his hypertension be well-developed. He is not ill-appearing, toxic-appearing or diaphoretic. HENT:      Head: Normocephalic and atraumatic.       Right Ear: Tympanic membrane, ear canal and external ear normal.      Left Ear: Tympanic membrane, ear canal and external ear nor hypertension  Plan: bp still mildly elevated with current bp meds but he has not been able to tolerate additional bp med. We agreed he will do work on losing more weight which will also help lower his bp. We will have him RTC in 3mos for recheck of his bp.

## 2021-11-05 RX ORDER — AMLODIPINE BESYLATE AND BENAZEPRIL HYDROCHLORIDE 5; 20 MG/1; MG/1
1 CAPSULE ORAL DAILY
Qty: 90 CAPSULE | Refills: 1 | OUTPATIENT
Start: 2021-11-05

## 2021-11-09 ENCOUNTER — IMMUNIZATION (OUTPATIENT)
Dept: LAB | Facility: HOSPITAL | Age: 66
End: 2021-11-09
Attending: EMERGENCY MEDICINE
Payer: COMMERCIAL

## 2021-11-09 DIAGNOSIS — Z23 NEED FOR VACCINATION: Primary | ICD-10-CM

## 2021-11-09 PROCEDURE — 0064A SARSCOV2 VAC 50MCG/0.25ML IM: CPT

## 2022-04-12 ENCOUNTER — IMMUNIZATION (OUTPATIENT)
Dept: LAB | Age: 67
End: 2022-04-12
Attending: EMERGENCY MEDICINE
Payer: COMMERCIAL

## 2022-04-12 DIAGNOSIS — Z23 NEED FOR VACCINATION: Primary | ICD-10-CM

## 2022-04-12 PROCEDURE — 0064A SARSCOV2 VAC 50MCG/0.25ML IM: CPT

## 2022-04-28 RX ORDER — AMLODIPINE BESYLATE AND BENAZEPRIL HYDROCHLORIDE 5; 20 MG/1; MG/1
1 CAPSULE ORAL DAILY
Qty: 90 CAPSULE | Refills: 0 | Status: SHIPPED | OUTPATIENT
Start: 2022-04-28

## 2022-04-28 NOTE — TELEPHONE ENCOUNTER
Please review; protocol failed/no protocol    Sent patient [employer] message to complete labs ordered at 11/04/2021 office visit    Please send, if appropriate      Requested Prescriptions   Pending Prescriptions Disp Refills    AMLODIPINE BESY-BENAZEPRIL HCL 5-20 MG Oral Cap [Pharmacy Med Name: AMLODIPINE-BENAZ 5/20MG CAPSULES] 90 capsule 1     Sig: Take 1 capsule by mouth daily.         Hypertensive Medications Protocol Failed - 4/27/2022 12:09 PM        Failed - CMP or BMP in past 12 months        Passed - Appointment in past 6 or next 3 months        Passed - GFR Non- > 50     Lab Results   Component Value Date    GFRNAA >60 11/14/2018                        Recent Outpatient Visits              5 months ago Annual physical exam    [address] Butcher MD    Office Visit    11 months ago Bilateral impacted cerumen    [facility], [address], Lyudmila Ames MD    Office Visit    1 year ago Bilateral impacted cerumen    [facility], [address], Lyudmila Ames MD    Office Visit    1 year ago Essential hypertension    [address], [address], Lucía Villegas MD    Office Visit    2 years ago Fungal otitis externa    [facility], [address], Lyudmila Ames MD    Office Visit             Future Appointments         Provider Department Appt Notes    In 4 weeks Tatiana Araiza MD [facility], [address], [address] [facility]

## 2022-05-26 ENCOUNTER — OFFICE VISIT (OUTPATIENT)
Dept: OTOLARYNGOLOGY | Facility: CLINIC | Age: 67
End: 2022-05-26
Payer: COMMERCIAL

## 2022-05-26 VITALS — WEIGHT: 190 LBS | HEIGHT: 69 IN | TEMPERATURE: 98 F | BODY MASS INDEX: 28.14 KG/M2

## 2022-05-26 DIAGNOSIS — H61.23 BILATERAL IMPACTED CERUMEN: Primary | ICD-10-CM

## 2022-05-26 PROCEDURE — 69210 REMOVE IMPACTED EAR WAX UNI: CPT | Performed by: OTOLARYNGOLOGY

## 2022-05-26 PROCEDURE — 3008F BODY MASS INDEX DOCD: CPT | Performed by: OTOLARYNGOLOGY

## 2022-06-22 ENCOUNTER — LAB ENCOUNTER (OUTPATIENT)
Dept: LAB | Age: 67
End: 2022-06-22
Attending: INTERNAL MEDICINE
Payer: COMMERCIAL

## 2022-06-22 DIAGNOSIS — Z00.00 ANNUAL PHYSICAL EXAM: ICD-10-CM

## 2022-06-22 LAB
ALBUMIN SERPL-MCNC: 3.8 G/DL (ref 3.4–5)
ALBUMIN/GLOB SERPL: 1.2 {RATIO} (ref 1–2)
ALP LIVER SERPL-CCNC: 77 U/L
ALT SERPL-CCNC: 30 U/L
ANION GAP SERPL CALC-SCNC: 7 MMOL/L (ref 0–18)
AST SERPL-CCNC: 19 U/L (ref 15–37)
BASOPHILS # BLD AUTO: 0.05 X10(3) UL (ref 0–0.2)
BASOPHILS NFR BLD AUTO: 0.7 %
BILIRUB SERPL-MCNC: 0.5 MG/DL (ref 0.1–2)
BUN BLD-MCNC: 19 MG/DL (ref 7–18)
BUN/CREAT SERPL: 18.3 (ref 10–20)
CALCIUM BLD-MCNC: 9.9 MG/DL (ref 8.5–10.1)
CHLORIDE SERPL-SCNC: 107 MMOL/L (ref 98–112)
CHOLEST SERPL-MCNC: 214 MG/DL (ref ?–200)
CO2 SERPL-SCNC: 27 MMOL/L (ref 21–32)
COMPLEXED PSA SERPL-MCNC: 5.61 NG/ML (ref ?–4)
CREAT BLD-MCNC: 1.04 MG/DL
DEPRECATED RDW RBC AUTO: 42.5 FL (ref 35.1–46.3)
EOSINOPHIL # BLD AUTO: 0.1 X10(3) UL (ref 0–0.7)
EOSINOPHIL NFR BLD AUTO: 1.3 %
ERYTHROCYTE [DISTWIDTH] IN BLOOD BY AUTOMATED COUNT: 12.3 % (ref 11–15)
FASTING PATIENT LIPID ANSWER: YES
FASTING STATUS PATIENT QL REPORTED: YES
GLOBULIN PLAS-MCNC: 3.2 G/DL (ref 2.8–4.4)
GLUCOSE BLD-MCNC: 100 MG/DL (ref 70–99)
HCT VFR BLD AUTO: 45.9 %
HDLC SERPL-MCNC: 40 MG/DL (ref 40–59)
HGB BLD-MCNC: 15.3 G/DL
IMM GRANULOCYTES # BLD AUTO: 0.03 X10(3) UL (ref 0–1)
IMM GRANULOCYTES NFR BLD: 0.4 %
LDLC SERPL CALC-MCNC: 153 MG/DL (ref ?–100)
LYMPHOCYTES # BLD AUTO: 1.75 X10(3) UL (ref 1–4)
LYMPHOCYTES NFR BLD AUTO: 23.3 %
MCH RBC QN AUTO: 31.2 PG (ref 26–34)
MCHC RBC AUTO-ENTMCNC: 33.3 G/DL (ref 31–37)
MCV RBC AUTO: 93.5 FL
MONOCYTES # BLD AUTO: 0.75 X10(3) UL (ref 0.1–1)
MONOCYTES NFR BLD AUTO: 10 %
NEUTROPHILS # BLD AUTO: 4.82 X10 (3) UL (ref 1.5–7.7)
NEUTROPHILS # BLD AUTO: 4.82 X10(3) UL (ref 1.5–7.7)
NEUTROPHILS NFR BLD AUTO: 64.3 %
NONHDLC SERPL-MCNC: 174 MG/DL (ref ?–130)
OSMOLALITY SERPL CALC.SUM OF ELEC: 294 MOSM/KG (ref 275–295)
PLATELET # BLD AUTO: 133 10(3)UL (ref 150–450)
POTASSIUM SERPL-SCNC: 4.6 MMOL/L (ref 3.5–5.1)
PROT SERPL-MCNC: 7 G/DL (ref 6.4–8.2)
RBC # BLD AUTO: 4.91 X10(6)UL
SODIUM SERPL-SCNC: 141 MMOL/L (ref 136–145)
TRIGL SERPL-MCNC: 115 MG/DL (ref 30–149)
VLDLC SERPL CALC-MCNC: 22 MG/DL (ref 0–30)
WBC # BLD AUTO: 7.5 X10(3) UL (ref 4–11)

## 2022-06-22 PROCEDURE — 80053 COMPREHEN METABOLIC PANEL: CPT

## 2022-06-22 PROCEDURE — 85025 COMPLETE CBC W/AUTO DIFF WBC: CPT

## 2022-06-22 PROCEDURE — 36415 COLL VENOUS BLD VENIPUNCTURE: CPT

## 2022-06-22 PROCEDURE — 80061 LIPID PANEL: CPT

## 2022-06-23 ENCOUNTER — TELEPHONE (OUTPATIENT)
Dept: INTERNAL MEDICINE CLINIC | Facility: CLINIC | Age: 67
End: 2022-06-23

## 2022-06-23 DIAGNOSIS — R97.20 ELEVATED PSA: Primary | ICD-10-CM

## 2022-06-27 ENCOUNTER — OFFICE VISIT (OUTPATIENT)
Dept: INTERNAL MEDICINE CLINIC | Facility: CLINIC | Age: 67
End: 2022-06-27
Payer: COMMERCIAL

## 2022-06-27 VITALS
SYSTOLIC BLOOD PRESSURE: 146 MMHG | DIASTOLIC BLOOD PRESSURE: 86 MMHG | HEIGHT: 67 IN | WEIGHT: 201.13 LBS | BODY MASS INDEX: 31.57 KG/M2 | HEART RATE: 82 BPM

## 2022-06-27 DIAGNOSIS — Z12.11 COLON CANCER SCREENING: ICD-10-CM

## 2022-06-27 DIAGNOSIS — H02.9 LESION OF RIGHT UPPER EYELID: ICD-10-CM

## 2022-06-27 DIAGNOSIS — D69.6 THROMBOCYTOPENIA (HCC): ICD-10-CM

## 2022-06-27 DIAGNOSIS — E78.00 HYPERCHOLESTEREMIA: ICD-10-CM

## 2022-06-27 DIAGNOSIS — R97.20 ELEVATED PSA: ICD-10-CM

## 2022-06-27 DIAGNOSIS — I10 PRIMARY HYPERTENSION: Primary | ICD-10-CM

## 2022-06-27 PROCEDURE — 3008F BODY MASS INDEX DOCD: CPT | Performed by: INTERNAL MEDICINE

## 2022-06-27 PROCEDURE — 99214 OFFICE O/P EST MOD 30 MIN: CPT | Performed by: INTERNAL MEDICINE

## 2022-06-27 PROCEDURE — 3079F DIAST BP 80-89 MM HG: CPT | Performed by: INTERNAL MEDICINE

## 2022-06-27 PROCEDURE — 3077F SYST BP >= 140 MM HG: CPT | Performed by: INTERNAL MEDICINE

## 2022-06-27 RX ORDER — AMLODIPINE BESYLATE AND BENAZEPRIL HYDROCHLORIDE 5; 20 MG/1; MG/1
1 CAPSULE ORAL DAILY
Qty: 30 CAPSULE | Refills: 0 | Status: SHIPPED | OUTPATIENT
Start: 2022-06-27 | End: 2022-06-27

## 2022-06-27 RX ORDER — COVID-19 MOLECULAR TEST ASSAY
KIT MISCELLANEOUS
COMMUNITY
Start: 2022-04-30

## 2022-06-27 RX ORDER — AMLODIPINE BESYLATE AND BENAZEPRIL HYDROCHLORIDE 5; 40 MG/1; MG/1
1 CAPSULE ORAL DAILY
Qty: 30 CAPSULE | Refills: 0 | Status: SHIPPED | OUTPATIENT
Start: 2022-06-27

## 2022-06-28 ENCOUNTER — TELEPHONE (OUTPATIENT)
Dept: OPHTHALMOLOGY | Facility: CLINIC | Age: 67
End: 2022-06-28

## 2022-06-28 NOTE — TELEPHONE ENCOUNTER
Patient referred by his pcp to be seen for new consult for lesion on right eye. Patient informed no openings until November, requesting appointment sooner. Please call at [phone],HERVE.

## 2022-07-07 ENCOUNTER — LAB ENCOUNTER (OUTPATIENT)
Dept: LAB | Facility: HOSPITAL | Age: 67
End: 2022-07-07
Attending: UROLOGY
Payer: COMMERCIAL

## 2022-07-07 ENCOUNTER — OFFICE VISIT (OUTPATIENT)
Dept: SURGERY | Facility: CLINIC | Age: 67
End: 2022-07-07
Payer: COMMERCIAL

## 2022-07-07 VITALS — DIASTOLIC BLOOD PRESSURE: 98 MMHG | SYSTOLIC BLOOD PRESSURE: 170 MMHG | HEART RATE: 85 BPM

## 2022-07-07 DIAGNOSIS — N13.8 BPH WITH OBSTRUCTION/LOWER URINARY TRACT SYMPTOMS: ICD-10-CM

## 2022-07-07 DIAGNOSIS — N40.1 BPH WITH OBSTRUCTION/LOWER URINARY TRACT SYMPTOMS: ICD-10-CM

## 2022-07-07 DIAGNOSIS — N40.1 BPH WITH OBSTRUCTION/LOWER URINARY TRACT SYMPTOMS: Primary | ICD-10-CM

## 2022-07-07 DIAGNOSIS — N13.8 BPH WITH OBSTRUCTION/LOWER URINARY TRACT SYMPTOMS: Primary | ICD-10-CM

## 2022-07-07 DIAGNOSIS — R97.20 ELEVATED PSA: ICD-10-CM

## 2022-07-07 LAB
BILIRUB UR QL: NEGATIVE
CLARITY UR: CLEAR
COLOR UR: YELLOW
GLUCOSE UR-MCNC: NEGATIVE MG/DL
HGB UR QL STRIP.AUTO: NEGATIVE
KETONES UR-MCNC: NEGATIVE MG/DL
LEUKOCYTE ESTERASE UR QL STRIP.AUTO: NEGATIVE
NITRITE UR QL STRIP.AUTO: NEGATIVE
PH UR: 6.5 [PH] (ref 5–8)
PROT UR-MCNC: NEGATIVE MG/DL
SP GR UR STRIP: 1.02 (ref 1–1.03)
UROBILINOGEN UR STRIP-ACNC: 0.2

## 2022-07-07 PROCEDURE — 3080F DIAST BP >= 90 MM HG: CPT | Performed by: UROLOGY

## 2022-07-07 PROCEDURE — 81003 URINALYSIS AUTO W/O SCOPE: CPT

## 2022-07-07 PROCEDURE — 99244 OFF/OP CNSLTJ NEW/EST MOD 40: CPT | Performed by: UROLOGY

## 2022-07-07 PROCEDURE — 3077F SYST BP >= 140 MM HG: CPT | Performed by: UROLOGY

## 2022-07-07 RX ORDER — DUTASTERIDE 0.5 MG/1
0.5 CAPSULE, LIQUID FILLED ORAL DAILY
Qty: 90 CAPSULE | Refills: 3 | Status: SHIPPED | OUTPATIENT
Start: 2022-07-07

## 2022-07-07 RX ORDER — TAMSULOSIN HYDROCHLORIDE 0.4 MG/1
0.4 CAPSULE ORAL DAILY
Qty: 90 CAPSULE | Refills: 3 | Status: SHIPPED | OUTPATIENT
Start: 2022-07-07 | End: 2022-07-07

## 2022-07-07 RX ORDER — TADALAFIL 5 MG/1
5 TABLET ORAL
Qty: 90 TABLET | Refills: 3 | Status: SHIPPED | OUTPATIENT
Start: 2022-07-07

## 2022-07-12 ENCOUNTER — TELEPHONE (OUTPATIENT)
Dept: SURGERY | Facility: CLINIC | Age: 67
End: 2022-07-12

## 2022-07-12 NOTE — TELEPHONE ENCOUNTER
Per pt does not want to take Tadalafil and Dutasteride stating it may cause prostate cancer. Per pt he wants to try something else.  Please advise

## 2022-07-13 NOTE — TELEPHONE ENCOUNTER
Spoke with pt and notified him of [facility] response below that he does not believe dutasteride causes cancer. Pt verbalized understanding but is still concerned with adverse reactions listed with medication. I explained this has been a controversial issue as to whether the medication has caused prostate cancer versus whether patients who are on these medications already have a higher risk of prostate cancer and that it is controversial whether medication has a causal or correlational relationship. Pt verbalized understanding and states he will discuss this with his primary care doctor as well and will update us with further decisions.

## 2022-07-13 NOTE — TELEPHONE ENCOUNTER
I do not believe that Dutasteride causes prostate cancer and I believe it is safe to use. However if he remains concerned, Im ok with him trying Tadalafil 5 mg PO every day (no risk of cancer) and followup with me as planned.   Thanks

## 2022-07-14 ENCOUNTER — LAB ENCOUNTER (OUTPATIENT)
Dept: LAB | Age: 67
End: 2022-07-14
Attending: INTERNAL MEDICINE
Payer: COMMERCIAL

## 2022-07-14 DIAGNOSIS — I10 PRIMARY HYPERTENSION: ICD-10-CM

## 2022-07-14 DIAGNOSIS — D69.6 THROMBOCYTOPENIA (HCC): ICD-10-CM

## 2022-07-14 LAB
ANION GAP SERPL CALC-SCNC: 4 MMOL/L (ref 0–18)
BUN BLD-MCNC: 20 MG/DL (ref 7–18)
BUN/CREAT SERPL: 19.2 (ref 10–20)
CALCIUM BLD-MCNC: 9.6 MG/DL (ref 8.5–10.1)
CHLORIDE SERPL-SCNC: 105 MMOL/L (ref 98–112)
CO2 SERPL-SCNC: 30 MMOL/L (ref 21–32)
CREAT BLD-MCNC: 1.04 MG/DL
FASTING STATUS PATIENT QL REPORTED: NO
GLUCOSE BLD-MCNC: 110 MG/DL (ref 70–99)
OSMOLALITY SERPL CALC.SUM OF ELEC: 291 MOSM/KG (ref 275–295)
PLATELET # BLD AUTO: 126 10(3)UL (ref 150–450)
POTASSIUM SERPL-SCNC: 4.3 MMOL/L (ref 3.5–5.1)
SODIUM SERPL-SCNC: 139 MMOL/L (ref 136–145)

## 2022-07-14 PROCEDURE — 85049 AUTOMATED PLATELET COUNT: CPT

## 2022-07-14 PROCEDURE — 82607 VITAMIN B-12: CPT | Performed by: INTERNAL MEDICINE

## 2022-07-14 PROCEDURE — 36415 COLL VENOUS BLD VENIPUNCTURE: CPT

## 2022-07-14 PROCEDURE — 85060 BLOOD SMEAR INTERPRETATION: CPT

## 2022-07-14 PROCEDURE — 80048 BASIC METABOLIC PNL TOTAL CA: CPT

## 2022-07-16 ENCOUNTER — TELEPHONE (OUTPATIENT)
Dept: INTERNAL MEDICINE CLINIC | Facility: CLINIC | Age: 67
End: 2022-07-16

## 2022-07-16 DIAGNOSIS — D69.6 THROMBOCYTOPENIA (HCC): ICD-10-CM

## 2022-07-16 DIAGNOSIS — R97.20 ELEVATED PSA: Primary | ICD-10-CM

## 2022-07-16 LAB — VIT B12 SERPL-MCNC: 418 PG/ML (ref 193–986)

## 2022-07-20 ENCOUNTER — OFFICE VISIT (OUTPATIENT)
Dept: OPHTHALMOLOGY | Facility: CLINIC | Age: 67
End: 2022-07-20
Payer: COMMERCIAL

## 2022-07-20 DIAGNOSIS — D23.111 PAPILLOMA OF RIGHT UPPER EYELID: Primary | ICD-10-CM

## 2022-07-20 PROCEDURE — 99202 OFFICE O/P NEW SF 15 MIN: CPT | Performed by: OPHTHALMOLOGY

## 2022-07-20 NOTE — ASSESSMENT & PLAN NOTE
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 Patient denies taking any blood thinners. Discussed risks, benefits, treatment and alternatives to excision. Patient elects excision.

## 2022-07-20 NOTE — PATIENT INSTRUCTIONS
Papilloma of right upper eyelid  Discussed that patient could have the papilloma excised in the near future. Patient denies taking any blood thinners. Discussed risks, benefits, treatment and alternatives to excision. Patient elects excision.

## 2022-07-21 ENCOUNTER — OFFICE VISIT (OUTPATIENT)
Dept: INTERNAL MEDICINE CLINIC | Facility: CLINIC | Age: 67
End: 2022-07-21
Payer: COMMERCIAL

## 2022-07-21 VITALS
SYSTOLIC BLOOD PRESSURE: 146 MMHG | HEIGHT: 67 IN | HEART RATE: 84 BPM | WEIGHT: 200.31 LBS | DIASTOLIC BLOOD PRESSURE: 80 MMHG | BODY MASS INDEX: 31.44 KG/M2 | OXYGEN SATURATION: 98 % | TEMPERATURE: 98 F

## 2022-07-21 DIAGNOSIS — E78.00 HYPERCHOLESTEREMIA: ICD-10-CM

## 2022-07-21 DIAGNOSIS — D69.6 THROMBOCYTOPENIA (HCC): ICD-10-CM

## 2022-07-21 DIAGNOSIS — I10 PRIMARY HYPERTENSION: Primary | ICD-10-CM

## 2022-07-21 PROCEDURE — 99213 OFFICE O/P EST LOW 20 MIN: CPT | Performed by: INTERNAL MEDICINE

## 2022-07-21 PROCEDURE — 3077F SYST BP >= 140 MM HG: CPT | Performed by: INTERNAL MEDICINE

## 2022-07-21 PROCEDURE — 3079F DIAST BP 80-89 MM HG: CPT | Performed by: INTERNAL MEDICINE

## 2022-07-21 PROCEDURE — 3008F BODY MASS INDEX DOCD: CPT | Performed by: INTERNAL MEDICINE

## 2022-07-21 RX ORDER — AMLODIPINE BESYLATE AND BENAZEPRIL HYDROCHLORIDE 10; 40 MG/1; MG/1
1 CAPSULE ORAL DAILY
Qty: 30 CAPSULE | Refills: 1 | Status: SHIPPED | OUTPATIENT
Start: 2022-07-21 | End: 2022-07-21

## 2022-07-21 RX ORDER — AMLODIPINE BESYLATE AND BENAZEPRIL HYDROCHLORIDE 10; 40 MG/1; MG/1
1 CAPSULE ORAL DAILY
Qty: 90 CAPSULE | Refills: 0 | Status: SHIPPED | OUTPATIENT
Start: 2022-07-21

## 2022-07-28 ENCOUNTER — TELEPHONE (OUTPATIENT)
Dept: INTERNAL MEDICINE CLINIC | Facility: CLINIC | Age: 67
End: 2022-07-28

## 2022-07-28 NOTE — TELEPHONE ENCOUNTER
Rima Man then to go back on the 5/20mg dose; he can take it today if he hasnt taken any bp med today.

## 2022-07-28 NOTE — TELEPHONE ENCOUNTER
Called patient, confirmed name and . Informed of below. Patient verbalized understanding and agrees.       Future Appointments   Date Time Provider Sanjuana Owensi   8/3/2022  3:30 PM Chema Ortiz MD [facility]   2022 11:15 AM Thi Baxter MD [facility]   2022 11:30 AM Emiliano Dewitt MD [facility]   2022  1:30 PM Chema Ortiz MD [facility]   2023 10:00 AM Lucie Harris MD [address] [facility]

## 2022-07-28 NOTE — TELEPHONE ENCOUNTER
pt stated that he had been taking  AMLODIPINE BESY-BENAZEPRIL HCL 5-20 MG but his blood pressure was in the 140s so it was then increased to  AMLODIPINE BESY-BENAZEPRIL HCL 5-40 MG but this dose did not do any changes to his blood pressure so it was then increases to AMLODIPINE BESY-BENAZEPRIL HCL 10-40 MG pt stated that he has been taking this dose for the past 6 days but he feels his blood pressure is getting worse as now his blood pressure readings are 150/89 148/89 and he feels he might be having side effects from the medication be feels tired and feels nauseous. Pt will like to go back to the AMLODIPINE BESY-BENAZEPRIL HCL 5-20 MG and is it ok for him to start the dose today?  or should he wait until tomorrow. Pt has not taken any AMLODIPINE BESY-BENAZEPRIL HCL medication for today.  please advise.        Future Appointments   Date Time Provider Sanjuana Barrett          8/11/2022 11:15 AM MD TATA [place]

## 2022-08-03 ENCOUNTER — OFFICE VISIT (OUTPATIENT)
Dept: OPHTHALMOLOGY | Facility: CLINIC | Age: 67
End: 2022-08-03
Payer: COMMERCIAL

## 2022-08-03 DIAGNOSIS — D23.111 PAPILLOMA OF RIGHT UPPER EYELID: Primary | ICD-10-CM

## 2022-08-03 PROCEDURE — 11440 EXC FACE-MM B9+MARG 0.5 CM/<: CPT | Performed by: OPHTHALMOLOGY

## 2022-08-03 NOTE — PATIENT INSTRUCTIONS
Papilloma of right upper eyelid  Pt had an excision of papilloma near right  lateral canthus (right upper) today by Dr. Marisa Singh with no complications. Erythromycin applied in the office today.

## 2022-08-03 NOTE — ASSESSMENT & PLAN NOTE
Pt had an excision of papilloma near right  lateral canthus (right upper) today by Dr. Everett Luciano with no complications. Erythromycin applied in the office today.

## 2022-08-04 ENCOUNTER — MED REC SCAN ONLY (OUTPATIENT)
Dept: INTERNAL MEDICINE CLINIC | Facility: CLINIC | Age: 67
End: 2022-08-04

## 2022-08-09 ENCOUNTER — LAB ENCOUNTER (OUTPATIENT)
Dept: LAB | Age: 67
End: 2022-08-09
Attending: INTERNAL MEDICINE
Payer: COMMERCIAL

## 2022-08-09 LAB — FOLATE SERPL-MCNC: 14.2 NG/ML (ref 8.7–?)

## 2022-08-09 PROCEDURE — 36415 COLL VENOUS BLD VENIPUNCTURE: CPT | Performed by: INTERNAL MEDICINE

## 2022-08-09 PROCEDURE — 82746 ASSAY OF FOLIC ACID SERUM: CPT | Performed by: INTERNAL MEDICINE

## 2022-08-11 ENCOUNTER — OFFICE VISIT (OUTPATIENT)
Dept: INTERNAL MEDICINE CLINIC | Facility: CLINIC | Age: 67
End: 2022-08-11
Payer: COMMERCIAL

## 2022-08-11 VITALS
OXYGEN SATURATION: 98 % | TEMPERATURE: 98 F | WEIGHT: 200.5 LBS | SYSTOLIC BLOOD PRESSURE: 138 MMHG | BODY MASS INDEX: 31.47 KG/M2 | DIASTOLIC BLOOD PRESSURE: 86 MMHG | HEIGHT: 67 IN | HEART RATE: 85 BPM

## 2022-08-11 DIAGNOSIS — I10 PRIMARY HYPERTENSION: Primary | ICD-10-CM

## 2022-08-11 DIAGNOSIS — D69.6 THROMBOCYTOPENIA (HCC): ICD-10-CM

## 2022-08-11 PROCEDURE — 3075F SYST BP GE 130 - 139MM HG: CPT | Performed by: INTERNAL MEDICINE

## 2022-08-11 PROCEDURE — 99213 OFFICE O/P EST LOW 20 MIN: CPT | Performed by: INTERNAL MEDICINE

## 2022-08-11 PROCEDURE — 3079F DIAST BP 80-89 MM HG: CPT | Performed by: INTERNAL MEDICINE

## 2022-08-11 PROCEDURE — 3008F BODY MASS INDEX DOCD: CPT | Performed by: INTERNAL MEDICINE

## 2022-08-11 RX ORDER — TADALAFIL 5 MG/1
TABLET ORAL
COMMUNITY
Start: 2022-08-01

## 2022-08-11 RX ORDER — DUTASTERIDE 0.5 MG/1
CAPSULE, LIQUID FILLED ORAL
COMMUNITY
Start: 2022-08-01

## 2022-08-11 RX ORDER — AMLODIPINE BESYLATE AND BENAZEPRIL HYDROCHLORIDE 5; 20 MG/1; MG/1
1 CAPSULE ORAL DAILY
Qty: 90 CAPSULE | Refills: 1 | Status: SHIPPED | OUTPATIENT
Start: 2022-08-11

## 2022-08-22 ENCOUNTER — OFFICE VISIT (OUTPATIENT)
Dept: HEMATOLOGY/ONCOLOGY | Facility: HOSPITAL | Age: 67
End: 2022-08-22
Attending: INTERNAL MEDICINE
Payer: COMMERCIAL

## 2022-08-22 VITALS
WEIGHT: 197.63 LBS | SYSTOLIC BLOOD PRESSURE: 162 MMHG | DIASTOLIC BLOOD PRESSURE: 85 MMHG | RESPIRATION RATE: 16 BRPM | TEMPERATURE: 99 F | HEIGHT: 67 IN | BODY MASS INDEX: 31.02 KG/M2 | HEART RATE: 81 BPM | OXYGEN SATURATION: 99 %

## 2022-08-22 DIAGNOSIS — R97.20 ELEVATED PSA: ICD-10-CM

## 2022-08-22 DIAGNOSIS — D69.6 THROMBOCYTOPENIA (HCC): Primary | ICD-10-CM

## 2022-08-22 PROCEDURE — 99211 OFF/OP EST MAY X REQ PHY/QHP: CPT

## 2022-10-13 ENCOUNTER — TELEPHONE (OUTPATIENT)
Dept: INTERNAL MEDICINE CLINIC | Facility: CLINIC | Age: 67
End: 2022-10-13

## 2022-10-13 NOTE — TELEPHONE ENCOUNTER
Pt would like to know if the doctor recommends for him to get the last covid booster vaccine that just came out.

## 2022-10-13 NOTE — TELEPHONE ENCOUNTER
Called patient in regards to message below, informed can received the Covid booster ( last Covid was April )    Provided information to call Central scheduling at [phone]  or [phone] to make an appointment     Patient verbalizes understanding and agrees.

## 2022-10-17 ENCOUNTER — TELEPHONE (OUTPATIENT)
Dept: INTERNAL MEDICINE CLINIC | Facility: CLINIC | Age: 67
End: 2022-10-17

## 2022-10-17 NOTE — TELEPHONE ENCOUNTER
Pharmacy    [place] [address] #[id number]  [place] [address] [phone], [phone]       Additional Information    Associated Reports   View Encounter   Priority and Order Details     Outpatient Medication Detail     Disp Refills Start End    amLODIPine Besy-Benazepril HCl 5-20 MG Oral Cap 90 capsule 1 8/11/2022     Sig - Route:  Take 1 capsule by mouth daily. - Oral    Sent to pharmacy as: amLODIPine Besy-Benazepril HCl 5-20 MG Oral Capsule (LOTREL)    E-Prescribing Status: Receipt confirmed by pharmacy (8/11/2022 11:56 AM CDT)

## 2022-10-20 ENCOUNTER — OFFICE VISIT (OUTPATIENT)
Dept: OPHTHALMOLOGY | Facility: CLINIC | Age: 67
End: 2022-10-20
Payer: COMMERCIAL

## 2022-10-20 DIAGNOSIS — H25.13 AGE-RELATED NUCLEAR CATARACT OF BOTH EYES: Primary | ICD-10-CM

## 2022-10-20 PROBLEM — D23.111 PAPILLOMA OF RIGHT UPPER EYELID: Status: RESOLVED | Noted: 2022-07-20 | Resolved: 2022-10-20

## 2022-10-20 PROBLEM — H02.9 LESION OF RIGHT UPPER EYELID: Status: RESOLVED | Noted: 2022-06-27 | Resolved: 2022-10-20

## 2022-10-20 PROCEDURE — 92014 COMPRE OPH EXAM EST PT 1/>: CPT | Performed by: OPHTHALMOLOGY

## 2022-10-20 PROCEDURE — 92015 DETERMINE REFRACTIVE STATE: CPT | Performed by: OPHTHALMOLOGY

## 2022-10-20 NOTE — PATIENT INSTRUCTIONS
Age-related nuclear cataract of both eyes  Discussed mild cataracts in both eyes that are not affecting vision and are not surgical at this time. RX for separate distance and reading glasses or progressives per patient's choice. Dr. Rafiq Barahona recommends progressive bifocal, but patient will decide. If patient wants to try over the counter glasses for reading- suggest +2.75 or +3.00. Patient says he will just try prescription reading only glasses. Reassured patient that other than cataracts, eyes look very healthy; there is no evidence of glaucoma or macular degeneration in either eye.

## 2022-10-20 NOTE — ASSESSMENT & PLAN NOTE
Discussed mild cataracts in both eyes that are not affecting vision and are not surgical at this time. RX for separate distance and reading glasses or progressives per patient's choice. Dr. Jacky Jacobs recommends progressive bifocal, but patient will decide. If patient wants to try over the counter glasses for reading- suggest +2.75 or +3.00. Patient says he will just try prescription reading only glasses. Reassured patient that other than cataracts, eyes look very healthy; there is no evidence of glaucoma or macular degeneration in either eye.

## 2022-10-24 ENCOUNTER — IMMUNIZATION (OUTPATIENT)
Dept: LAB | Age: 67
End: 2022-10-24
Attending: EMERGENCY MEDICINE
Payer: COMMERCIAL

## 2022-10-24 DIAGNOSIS — Z23 NEED FOR VACCINATION: Primary | ICD-10-CM

## 2022-10-24 PROCEDURE — 0134A SARSCOV2 VAC BVL 50MCG/0.5ML: CPT

## 2023-01-18 RX ORDER — AMLODIPINE BESYLATE AND BENAZEPRIL HYDROCHLORIDE 5; 20 MG/1; MG/1
1 CAPSULE ORAL DAILY
Qty: 90 CAPSULE | Refills: 1 | Status: SHIPPED | OUTPATIENT
Start: 2023-01-18

## 2023-03-30 ENCOUNTER — OFFICE VISIT (OUTPATIENT)
Dept: INTERNAL MEDICINE CLINIC | Facility: CLINIC | Age: 68
End: 2023-03-30

## 2023-03-30 ENCOUNTER — LAB ENCOUNTER (OUTPATIENT)
Dept: LAB | Age: 68
End: 2023-03-30
Attending: INTERNAL MEDICINE
Payer: MEDICARE

## 2023-03-30 VITALS
WEIGHT: 210 LBS | TEMPERATURE: 98 F | HEIGHT: 67 IN | DIASTOLIC BLOOD PRESSURE: 86 MMHG | HEART RATE: 78 BPM | SYSTOLIC BLOOD PRESSURE: 146 MMHG | BODY MASS INDEX: 32.96 KG/M2

## 2023-03-30 DIAGNOSIS — D69.6 THROMBOCYTOPENIA (HCC): ICD-10-CM

## 2023-03-30 DIAGNOSIS — I10 PRIMARY HYPERTENSION: Primary | ICD-10-CM

## 2023-03-30 DIAGNOSIS — R97.20 ELEVATED PSA: ICD-10-CM

## 2023-03-30 DIAGNOSIS — Z12.11 COLON CANCER SCREENING: ICD-10-CM

## 2023-03-30 DIAGNOSIS — E78.00 HYPERCHOLESTEREMIA: ICD-10-CM

## 2023-03-30 LAB
BASOPHILS # BLD AUTO: 0.06 X10(3) UL (ref 0–0.2)
BASOPHILS NFR BLD AUTO: 0.7 %
DEPRECATED RDW RBC AUTO: 40.3 FL (ref 35.1–46.3)
EOSINOPHIL # BLD AUTO: 0.05 X10(3) UL (ref 0–0.7)
EOSINOPHIL NFR BLD AUTO: 0.6 %
ERYTHROCYTE [DISTWIDTH] IN BLOOD BY AUTOMATED COUNT: 12.1 % (ref 11–15)
HCT VFR BLD AUTO: 45.2 %
HGB BLD-MCNC: 15.2 G/DL
IMM GRANULOCYTES # BLD AUTO: 0.04 X10(3) UL (ref 0–1)
IMM GRANULOCYTES NFR BLD: 0.5 %
LYMPHOCYTES # BLD AUTO: 1.56 X10(3) UL (ref 1–4)
LYMPHOCYTES NFR BLD AUTO: 18.7 %
MCH RBC QN AUTO: 30.8 PG (ref 26–34)
MCHC RBC AUTO-ENTMCNC: 33.6 G/DL (ref 31–37)
MCV RBC AUTO: 91.5 FL
MONOCYTES # BLD AUTO: 0.77 X10(3) UL (ref 0.1–1)
MONOCYTES NFR BLD AUTO: 9.2 %
NEUTROPHILS # BLD AUTO: 5.87 X10 (3) UL (ref 1.5–7.7)
NEUTROPHILS # BLD AUTO: 5.87 X10(3) UL (ref 1.5–7.7)
NEUTROPHILS NFR BLD AUTO: 70.3 %
PLATELET # BLD AUTO: 145 10(3)UL (ref 150–450)
RBC # BLD AUTO: 4.94 X10(6)UL
WBC # BLD AUTO: 8.4 X10(3) UL (ref 4–11)

## 2023-03-30 PROCEDURE — 99214 OFFICE O/P EST MOD 30 MIN: CPT | Performed by: INTERNAL MEDICINE

## 2023-03-30 PROCEDURE — 85025 COMPLETE CBC W/AUTO DIFF WBC: CPT

## 2023-03-30 PROCEDURE — 36415 COLL VENOUS BLD VENIPUNCTURE: CPT

## 2023-03-30 RX ORDER — AMLODIPINE BESYLATE AND BENAZEPRIL HYDROCHLORIDE 5; 40 MG/1; MG/1
1 CAPSULE ORAL DAILY
Qty: 30 CAPSULE | Refills: 1 | Status: SHIPPED | OUTPATIENT
Start: 2023-03-30

## 2023-04-11 ENCOUNTER — HOSPITAL ENCOUNTER (OUTPATIENT)
Dept: ULTRASOUND IMAGING | Age: 68
Discharge: HOME | End: 2023-04-11
Attending: INTERNAL MEDICINE
Payer: MEDICARE

## 2023-04-11 DIAGNOSIS — D69.6 THROMBOCYTOPENIA (HCC): ICD-10-CM

## 2023-04-11 PROCEDURE — 76705 ECHO EXAM OF ABDOMEN: CPT | Performed by: INTERNAL MEDICINE

## 2023-04-18 ENCOUNTER — OFFICE VISIT (OUTPATIENT)
Dept: HEMATOLOGY/ONCOLOGY | Facility: HOSPITAL | Age: 68
End: 2023-04-18
Attending: INTERNAL MEDICINE
Payer: MEDICARE

## 2023-04-18 VITALS
HEIGHT: 67 IN | TEMPERATURE: 98 F | BODY MASS INDEX: 32.96 KG/M2 | HEART RATE: 82 BPM | WEIGHT: 210 LBS | RESPIRATION RATE: 18 BRPM | SYSTOLIC BLOOD PRESSURE: 180 MMHG | OXYGEN SATURATION: 100 % | DIASTOLIC BLOOD PRESSURE: 94 MMHG

## 2023-04-18 DIAGNOSIS — D69.6 THROMBOCYTOPENIA (HCC): Primary | ICD-10-CM

## 2023-04-18 DIAGNOSIS — R97.20 ELEVATED PSA: ICD-10-CM

## 2023-04-18 PROCEDURE — 99214 OFFICE O/P EST MOD 30 MIN: CPT | Performed by: INTERNAL MEDICINE

## 2023-04-25 ENCOUNTER — OFFICE VISIT (OUTPATIENT)
Dept: OTOLARYNGOLOGY | Facility: CLINIC | Age: 68
End: 2023-04-25

## 2023-04-25 DIAGNOSIS — H61.23 BILATERAL IMPACTED CERUMEN: Primary | ICD-10-CM

## 2023-04-25 PROCEDURE — 69210 REMOVE IMPACTED EAR WAX UNI: CPT | Performed by: OTOLARYNGOLOGY

## 2023-04-27 ENCOUNTER — OFFICE VISIT (OUTPATIENT)
Dept: INTERNAL MEDICINE CLINIC | Facility: CLINIC | Age: 68
End: 2023-04-27

## 2023-04-27 VITALS
DIASTOLIC BLOOD PRESSURE: 94 MMHG | HEART RATE: 82 BPM | OXYGEN SATURATION: 97 % | TEMPERATURE: 98 F | WEIGHT: 208.5 LBS | HEIGHT: 67 IN | SYSTOLIC BLOOD PRESSURE: 176 MMHG | BODY MASS INDEX: 32.72 KG/M2

## 2023-04-27 DIAGNOSIS — I10 PRIMARY HYPERTENSION: Primary | ICD-10-CM

## 2023-04-27 DIAGNOSIS — K92.1 HEMATOCHEZIA: ICD-10-CM

## 2023-04-27 PROCEDURE — 99213 OFFICE O/P EST LOW 20 MIN: CPT | Performed by: INTERNAL MEDICINE

## 2023-04-27 RX ORDER — AMLODIPINE BESYLATE AND BENAZEPRIL HYDROCHLORIDE 5; 40 MG/1; MG/1
1 CAPSULE ORAL DAILY
Qty: 30 CAPSULE | Refills: 1 | Status: SHIPPED | OUTPATIENT
Start: 2023-04-27

## 2023-05-12 ENCOUNTER — TELEPHONE (OUTPATIENT)
Dept: INTERNAL MEDICINE CLINIC | Facility: CLINIC | Age: 68
End: 2023-05-12

## 2023-05-12 NOTE — TELEPHONE ENCOUNTER
We can add him in on Wednesday 5/17/23 at 1:30pm  For now, I would suggest to keep the amlodipine 5mg and telmisartan 80mg that cardio put him on and want to give it few more days since it's been only less than a week since he had started it. We can then decide on next visit on 5/17/23 if will need to change or not.

## 2023-05-12 NOTE — TELEPHONE ENCOUNTER
Spoke to patient and relayed Dr. Gabe Waters message below. Patient verbalized understanding and had no further questions.      Future Appointments   Date Time Provider Sanjuana Barrett   5/17/2023  1:30 PM MD TATA Pereira

## 2023-05-17 ENCOUNTER — OFFICE VISIT (OUTPATIENT)
Dept: INTERNAL MEDICINE CLINIC | Facility: CLINIC | Age: 68
End: 2023-05-17

## 2023-05-17 VITALS
DIASTOLIC BLOOD PRESSURE: 84 MMHG | SYSTOLIC BLOOD PRESSURE: 164 MMHG | HEIGHT: 67 IN | HEART RATE: 98 BPM | BODY MASS INDEX: 33.18 KG/M2 | WEIGHT: 211.38 LBS | TEMPERATURE: 98 F | OXYGEN SATURATION: 98 %

## 2023-05-17 DIAGNOSIS — K92.1 HEMATOCHEZIA: ICD-10-CM

## 2023-05-17 DIAGNOSIS — I10 PRIMARY HYPERTENSION: Primary | ICD-10-CM

## 2023-05-17 PROCEDURE — 99213 OFFICE O/P EST LOW 20 MIN: CPT | Performed by: INTERNAL MEDICINE

## 2023-05-17 RX ORDER — TELMISARTAN 80 MG/1
TABLET ORAL
COMMUNITY
Start: 2023-05-05

## 2023-05-17 RX ORDER — HYDROCORTISONE ACETATE 25 MG/1
25 SUPPOSITORY RECTAL DAILY
Qty: 7 SUPPOSITORY | Refills: 0 | Status: SHIPPED | OUTPATIENT
Start: 2023-05-17 | End: 2023-05-24

## 2023-05-17 RX ORDER — AMLODIPINE BESYLATE 5 MG/1
TABLET ORAL
COMMUNITY
Start: 2023-05-05

## 2023-06-05 ENCOUNTER — OFFICE VISIT (OUTPATIENT)
Facility: CLINIC | Age: 68
End: 2023-06-05

## 2023-06-05 ENCOUNTER — TELEPHONE (OUTPATIENT)
Facility: CLINIC | Age: 68
End: 2023-06-05

## 2023-06-05 VITALS
WEIGHT: 212 LBS | HEART RATE: 88 BPM | SYSTOLIC BLOOD PRESSURE: 164 MMHG | DIASTOLIC BLOOD PRESSURE: 96 MMHG | BODY MASS INDEX: 33.27 KG/M2 | HEIGHT: 67 IN

## 2023-06-05 DIAGNOSIS — K64.9 HEMORRHOIDS, UNSPECIFIED HEMORRHOID TYPE: ICD-10-CM

## 2023-06-05 DIAGNOSIS — Z12.11 SCREEN FOR COLON CANCER: Primary | ICD-10-CM

## 2023-06-05 DIAGNOSIS — Z12.11 COLON CANCER SCREENING: Primary | ICD-10-CM

## 2023-06-05 RX ORDER — SODIUM, POTASSIUM,MAG SULFATES 17.5-3.13G
SOLUTION, RECONSTITUTED, ORAL ORAL
Qty: 1 EACH | Refills: 0 | Status: SHIPPED | OUTPATIENT
Start: 2023-06-05

## 2023-06-05 NOTE — TELEPHONE ENCOUNTER
Scheduled for:  Colonoscopy [id number]  Provider Name:  Dr. Tami James  Date:  8/29/2023  Location:  [facility]  Sedation:  [facility]  Time:  11:15 am, arrival 10:15 am  Prep:  Suprep  Meds/Allergies Reconciled?:  Physician reviewed  Diagnosis with codes:  Screening for colon cancer Z12.11; Hemorrhoids K64.9  Was patient informed to call insurance with codes (Y/N):  Yes  Referral sent?:  Referral was sent at the time of electronic surgical scheduling. [address] [place] [address] or [address] [place] [address] notified?:  I sent an electronic request to Endo Scheduling and received a confirmation today. Medication Orders:  N/A  Misc Orders:  N/A     Further instructions given by staff:  I discussed the prep instructions with the patient which he verbally understood and provided patient with prep instruction sheet.

## 2023-06-05 NOTE — PATIENT INSTRUCTIONS
1. Schedule colonoscopy with  or Dr. Ana Wise with [facility] [Diagnosis: crc screening, hemorrhoids]    2.  bowel prep from pharmacy (split suprep)    3. Continue all medications as normal for your procedure. 4. Read all bowel prep instructions carefully. Bowel prep instructions can also be found online at:  www.[email or web address].org/giprep     5. AVOID seeds, nuts, popcorn, raw fruits and vegetables for 3 days before procedure    6. You MAY need to go for COVID testing 72 hours before procedure. The testing team will call you a few days before your procedure to discuss with you if testing is required. If you are asked to go for COVID testing and do not completed the test, the procedure cannot be performed. 7. If you start any NEW medication after your visit today, please notify us. Certain medications (like iron or weight loss medications) will need to be held before the procedure, or the procedure cannot be performed safely.

## 2023-07-17 ENCOUNTER — TELEPHONE (OUTPATIENT)
Dept: INTERNAL MEDICINE CLINIC | Facility: CLINIC | Age: 68
End: 2023-07-17

## 2023-07-17 RX ORDER — OLMESARTAN MEDOXOMIL 40 MG/1
40 TABLET ORAL DAILY
COMMUNITY
Start: 2023-06-05 | End: 2023-07-19

## 2023-07-17 RX ORDER — NIFEDIPINE 30 MG/1
30 TABLET, FILM COATED, EXTENDED RELEASE ORAL DAILY
Qty: 30 TABLET | Refills: 3 | COMMUNITY
Start: 2023-06-05 | End: 2023-07-19

## 2023-07-17 NOTE — TELEPHONE ENCOUNTER
Dr Esther Paiz, please advise    Patient (name and  verified) calling to change his blood pressure back to the Amlodipine. Patient saw the cardiologist and was put on the medication Nifedipine and Olmesartan but neither of medications are helping per patient. Patient is not going to follow up with the cardiologist regarding his blood pressure anymore because he is not happy with the follow up care. Do you want patient to schedule a follow up appt with you? Patient is asking to go back on the Amlodipine. You do not have any open appts this week, do you want to open an appt this week?       Future Appointments   Date Time Provider Sanjuana Barrett   2023  1:00 PM Valente Hurst MD [facility] ADO   2023 11:15 AM [facility], PROCEDURE [address] None   10/24/2023 10:45 AM Denny Pineda MD [address] [facility]   2024  1:00 PM Yvrose Cardoza MD [address] [place] [address] ONC EMO

## 2023-07-18 RX ORDER — AMLODIPINE AND BENAZEPRIL HYDROCHLORIDE 5; 40 MG/1; MG/1
1 CAPSULE ORAL DAILY
OUTPATIENT
Start: 2023-07-18

## 2023-07-19 ENCOUNTER — OFFICE VISIT (OUTPATIENT)
Dept: INTERNAL MEDICINE CLINIC | Facility: CLINIC | Age: 68
End: 2023-07-19

## 2023-07-19 VITALS
DIASTOLIC BLOOD PRESSURE: 84 MMHG | HEART RATE: 103 BPM | HEIGHT: 67 IN | WEIGHT: 214.88 LBS | BODY MASS INDEX: 33.73 KG/M2 | TEMPERATURE: 98 F | OXYGEN SATURATION: 96 % | SYSTOLIC BLOOD PRESSURE: 148 MMHG

## 2023-07-19 DIAGNOSIS — I10 PRIMARY HYPERTENSION: Primary | ICD-10-CM

## 2023-07-19 PROCEDURE — 99213 OFFICE O/P EST LOW 20 MIN: CPT | Performed by: INTERNAL MEDICINE

## 2023-07-19 RX ORDER — AMLODIPINE AND BENAZEPRIL HYDROCHLORIDE 5; 40 MG/1; MG/1
1 CAPSULE ORAL DAILY
Qty: 90 CAPSULE | Refills: 0 | Status: SHIPPED | OUTPATIENT
Start: 2023-07-19

## 2023-07-19 NOTE — PROGRESS NOTES
Subjective:   Patient ID: Benjamin Urrutia is a 74year old male. Hypertension  The current episode started more than 1 year ago. The problem has been waxing and waning since onset. The problem is uncontrolled. Pertinent negatives include no chest pain, peripheral edema or shortness of breath. There are no associated agents to hypertension. Risk factors for coronary artery disease include diabetes mellitus, dyslipidemia, male gender and obesity. Past treatments include calcium channel blockers, angiotensin blockers and lifestyle changes. The current treatment provides mild improvement. There are no compliance problems. There is no history of angina, kidney disease, CAD/MI, CVA, heart failure, PVD or retinopathy. There is no history of chronic renal disease, a hypertension causing med or a thyroid problem. History/Other:   Review of Systems   Respiratory:  Negative for shortness of breath. Cardiovascular:  Negative for chest pain. Current Outpatient Medications   Medication Sig Dispense Refill    amLODIPine Besy-Benazepril HCl 5-40 MG Oral Cap Take 1 capsule by mouth daily. 90 capsule 0    Na Sulfate-K Sulfate-Mg Sulf (SUPREP BOWEL PREP KIT) 17.5-3.13-1.6 GM/177ML Oral Solution Take as directed (Patient not taking: Reported on 7/19/2023) 1 each 0     Allergies:No Known Allergies    Objective:   Physical Exam  Constitutional:       General: He is not in acute distress. Appearance: He is well-developed. He is obese. He is not ill-appearing, toxic-appearing or diaphoretic. HENT:      Head: Normocephalic and atraumatic. Right Ear: External ear normal.      Left Ear: External ear normal.      Nose: Nose normal.      Mouth/Throat:      Pharynx: No oropharyngeal exudate. Eyes:      General:         Right eye: No discharge. Left eye: No discharge. Conjunctiva/sclera: Conjunctivae normal.      Pupils: Pupils are equal, round, and reactive to light.    Neck:      Vascular: No carotid bruit or JVD. Cardiovascular:      Rate and Rhythm: Normal rate and regular rhythm. Heart sounds: Normal heart sounds. No murmur heard. Pulmonary:      Effort: Pulmonary effort is normal. No respiratory distress. Breath sounds: Normal breath sounds. No wheezing or rales. Abdominal:      General: Bowel sounds are normal. There is no distension. Palpations: Abdomen is soft. There is no mass. Tenderness: There is no abdominal tenderness. There is no guarding or rebound. Musculoskeletal:         General: No tenderness. Normal range of motion. Cervical back: Normal range of motion and neck supple. No rigidity or tenderness. Right lower leg: Edema present. Left lower leg: Edema present. Lymphadenopathy:      Cervical: No cervical adenopathy. Skin:     General: Skin is warm and dry. Findings: No rash. Neurological:      Mental Status: He is alert and oriented to person, place, and time. Assessment & Plan:   (I10) Primary hypertension  (primary encounter diagnosis)  Plan: pt had not noted any difference with bp meds he was taking lotrel 5/40 vs nifdipine/olmesartan given by cardio he had seen last month. He was to go back to lotrel 5/40mg daily. I told him we can do so but  and will need to taken additional bp med. Can't use diurectics due to his BPH/  He asked to wait for now and he will be back in  2weeks for his annual physical  and we can consider adding  another bp med '   We will see him back for his physical in 2 weeks. Again emphasized to pt compliance with bp meds. No orders of the defined types were placed in this encounter. Meds This Visit:  Requested Prescriptions     Signed Prescriptions Disp Refills    amLODIPine Besy-Benazepril HCl 5-40 MG Oral Cap 90 capsule 0     Sig: Take 1 capsule by mouth daily.        Imaging & Referrals:  None

## 2023-07-19 NOTE — TELEPHONE ENCOUNTER
REFILL REQUEST DENIED-DR Cabrera needs to re evaluate the patient first .     See condition update encounter 7/17/23.    Future Appointments   Date Time Provider Sanjuana Barrett   7/19/2023 11:30 AM MD TATA Hernandez [facility]   8/7/2023  1:00 PM MD TATA HernandezO

## 2023-08-02 ENCOUNTER — LAB ENCOUNTER (OUTPATIENT)
Dept: LAB | Age: 68
End: 2023-08-02
Attending: INTERNAL MEDICINE
Payer: MEDICARE

## 2023-08-02 DIAGNOSIS — Z00.00 MEDICARE ANNUAL WELLNESS VISIT, INITIAL: ICD-10-CM

## 2023-08-02 DIAGNOSIS — Z12.5 PROSTATE CANCER SCREENING: ICD-10-CM

## 2023-08-02 LAB
ALBUMIN SERPL-MCNC: 3.9 G/DL (ref 3.4–5)
ALBUMIN/GLOB SERPL: 1.2 {RATIO} (ref 1–2)
ALP LIVER SERPL-CCNC: 101 U/L
ALT SERPL-CCNC: 30 U/L
ANION GAP SERPL CALC-SCNC: 4 MMOL/L (ref 0–18)
AST SERPL-CCNC: 20 U/L (ref 15–37)
BASOPHILS # BLD AUTO: 0.07 X10(3) UL (ref 0–0.2)
BASOPHILS NFR BLD AUTO: 0.9 %
BILIRUB SERPL-MCNC: 0.4 MG/DL (ref 0.1–2)
BILIRUB UR QL STRIP.AUTO: NEGATIVE
BUN BLD-MCNC: 21 MG/DL (ref 7–18)
CALCIUM BLD-MCNC: 9.9 MG/DL (ref 8.5–10.1)
CHLORIDE SERPL-SCNC: 108 MMOL/L (ref 98–112)
CHOLEST SERPL-MCNC: 228 MG/DL (ref ?–200)
CLARITY UR REFRACT.AUTO: CLEAR
CO2 SERPL-SCNC: 27 MMOL/L (ref 21–32)
COLOR UR AUTO: YELLOW
COMPLEXED PSA SERPL-MCNC: 5.54 NG/ML (ref ?–4)
CREAT BLD-MCNC: 1.06 MG/DL
EGFRCR SERPLBLD CKD-EPI 2021: 78 ML/MIN/1.73M2 (ref 60–?)
EOSINOPHIL # BLD AUTO: 0.09 X10(3) UL (ref 0–0.7)
EOSINOPHIL NFR BLD AUTO: 1.2 %
ERYTHROCYTE [DISTWIDTH] IN BLOOD BY AUTOMATED COUNT: 12.3 %
FASTING PATIENT LIPID ANSWER: YES
FASTING STATUS PATIENT QL REPORTED: YES
GLOBULIN PLAS-MCNC: 3.3 G/DL (ref 2.8–4.4)
GLUCOSE BLD-MCNC: 109 MG/DL (ref 70–99)
GLUCOSE UR STRIP.AUTO-MCNC: NEGATIVE MG/DL
HCT VFR BLD AUTO: 48.4 %
HDLC SERPL-MCNC: 46 MG/DL (ref 40–59)
HGB BLD-MCNC: 15.6 G/DL
IMM GRANULOCYTES # BLD AUTO: 0.05 X10(3) UL (ref 0–1)
IMM GRANULOCYTES NFR BLD: 0.7 %
KETONES UR STRIP.AUTO-MCNC: NEGATIVE MG/DL
LDLC SERPL CALC-MCNC: 161 MG/DL (ref ?–100)
LEUKOCYTE ESTERASE UR QL STRIP.AUTO: NEGATIVE
LYMPHOCYTES # BLD AUTO: 1.6 X10(3) UL (ref 1–4)
LYMPHOCYTES NFR BLD AUTO: 21 %
MCH RBC QN AUTO: 30.3 PG (ref 26–34)
MCHC RBC AUTO-ENTMCNC: 32.2 G/DL (ref 31–37)
MCV RBC AUTO: 94 FL
MONOCYTES # BLD AUTO: 0.66 X10(3) UL (ref 0.1–1)
MONOCYTES NFR BLD AUTO: 8.7 %
NEUTROPHILS # BLD AUTO: 5.14 X10 (3) UL (ref 1.5–7.7)
NEUTROPHILS # BLD AUTO: 5.14 X10(3) UL (ref 1.5–7.7)
NEUTROPHILS NFR BLD AUTO: 67.5 %
NITRITE UR QL STRIP.AUTO: NEGATIVE
NONHDLC SERPL-MCNC: 182 MG/DL (ref ?–130)
OSMOLALITY SERPL CALC.SUM OF ELEC: 292 MOSM/KG (ref 275–295)
PH UR STRIP.AUTO: 6 [PH] (ref 5–8)
PLATELET # BLD AUTO: 135 10(3)UL (ref 150–450)
POTASSIUM SERPL-SCNC: 4.8 MMOL/L (ref 3.5–5.1)
PROT SERPL-MCNC: 7.2 G/DL (ref 6.4–8.2)
PROT UR STRIP.AUTO-MCNC: NEGATIVE MG/DL
RBC # BLD AUTO: 5.15 X10(6)UL
RBC UR QL AUTO: NEGATIVE
SODIUM SERPL-SCNC: 139 MMOL/L (ref 136–145)
SP GR UR STRIP.AUTO: 1.02 (ref 1–1.03)
TRIGL SERPL-MCNC: 114 MG/DL (ref 30–149)
TSI SER-ACNC: 2.06 MIU/ML (ref 0.36–3.74)
UROBILINOGEN UR STRIP.AUTO-MCNC: <2 MG/DL
VLDLC SERPL CALC-MCNC: 22 MG/DL (ref 0–30)
WBC # BLD AUTO: 7.6 X10(3) UL (ref 4–11)

## 2023-08-02 PROCEDURE — 80061 LIPID PANEL: CPT

## 2023-08-02 PROCEDURE — 84443 ASSAY THYROID STIM HORMONE: CPT

## 2023-08-02 PROCEDURE — 80053 COMPREHEN METABOLIC PANEL: CPT

## 2023-08-02 PROCEDURE — 81003 URINALYSIS AUTO W/O SCOPE: CPT

## 2023-08-02 PROCEDURE — 36415 COLL VENOUS BLD VENIPUNCTURE: CPT

## 2023-08-02 PROCEDURE — 85025 COMPLETE CBC W/AUTO DIFF WBC: CPT

## 2023-08-07 ENCOUNTER — OFFICE VISIT (OUTPATIENT)
Dept: INTERNAL MEDICINE CLINIC | Facility: CLINIC | Age: 68
End: 2023-08-07

## 2023-08-07 VITALS
SYSTOLIC BLOOD PRESSURE: 146 MMHG | HEIGHT: 67 IN | DIASTOLIC BLOOD PRESSURE: 88 MMHG | BODY MASS INDEX: 33.82 KG/M2 | TEMPERATURE: 98 F | HEART RATE: 90 BPM | WEIGHT: 215.5 LBS | OXYGEN SATURATION: 95 %

## 2023-08-07 DIAGNOSIS — I10 PRIMARY HYPERTENSION: ICD-10-CM

## 2023-08-07 DIAGNOSIS — R73.01 IFG (IMPAIRED FASTING GLUCOSE): ICD-10-CM

## 2023-08-07 DIAGNOSIS — Z12.11 COLON CANCER SCREENING: ICD-10-CM

## 2023-08-07 DIAGNOSIS — Z13.6 SCREENING FOR HEART DISEASE: ICD-10-CM

## 2023-08-07 DIAGNOSIS — E66.09 CLASS 1 OBESITY DUE TO EXCESS CALORIES WITH SERIOUS COMORBIDITY AND BODY MASS INDEX (BMI) OF 33.0 TO 33.9 IN ADULT: ICD-10-CM

## 2023-08-07 DIAGNOSIS — E78.00 HYPERCHOLESTEREMIA: ICD-10-CM

## 2023-08-07 DIAGNOSIS — D69.6 THROMBOCYTOPENIA (HCC): ICD-10-CM

## 2023-08-07 DIAGNOSIS — H25.13 AGE-RELATED NUCLEAR CATARACT OF BOTH EYES: ICD-10-CM

## 2023-08-07 DIAGNOSIS — Z00.00 MEDICARE ANNUAL WELLNESS VISIT, INITIAL: Primary | ICD-10-CM

## 2023-08-07 DIAGNOSIS — R97.20 ELEVATED PSA: ICD-10-CM

## 2023-08-07 LAB
CARTRIDGE LOT#: NORMAL NUMERIC
HEMOGLOBIN A1C: 5.4 % (ref 4.3–5.6)

## 2023-08-09 ENCOUNTER — TELEPHONE (OUTPATIENT)
Facility: CLINIC | Age: 68
End: 2023-08-09

## 2023-10-10 NOTE — TELEPHONE ENCOUNTER
Patient calling to state had appointment on 10/12/23 but had to cancel due to illness, asking if Dr. Jes Banegas can send medication through and will reschedule appointment in future.

## 2023-10-11 NOTE — TELEPHONE ENCOUNTER
Please review. Protocol Failed or has No Protocol. Requested Prescriptions   Pending Prescriptions Disp Refills    AMLODIPINE BESY-BENAZEPRIL HCL 5-40 MG Oral Cap [Pharmacy Med Name: AMLODIPINE-BENAZ 5/40MG CAPSULES] 90 capsule 0     Sig: Take 1 capsule by mouth daily.        Hypertensive Medications Protocol Failed - 10/10/2023  9:23 AM        Failed - Last BP reading less than 140/90     BP Readings from Last 1 Encounters:  08/07/23 : 146/88              Passed - In person appointment in the past 12 or next 3 months     Recent Outpatient Visits              2 months ago Medicare annual wellness visit, initial    Danica Hernandez MD    Office Visit    2 months ago Primary hypertension    Danica Hernandez MD    Office Visit    4 months ago Colon cancer screening    Carolann Chung, [address]    Office Visit    4 months ago Primary hypertension    Danica Hernandez MD    Office Visit    5 months ago Primary hypertension    Danica Hernandez MD    Office Visit          Future Appointments         Provider Department Appt Notes    In 1 week MD Carolann Payan, [address], [facility] EP EE  EP/ EE                      Passed - CMP or BMP in past 6 months     Recent Results (from the past 4392 hour(s))   Comp Metabolic Panel (14)    Collection Time: 08/02/23  9:09 AM   Result Value Ref Range    Glucose 109 (H) 70 - 99 mg/dL    Sodium 139 136 - 145 mmol/L    Potassium 4.8 3.5 - 5.1 mmol/L    Chloride 108 98 - 112 mmol/L    CO2 27.0 21.0 - 32.0 mmol/L    Anion Gap 4 0 - 18 mmol/L    BUN 21 (H) 7 - 18 mg/dL    Creatinine 1.06 0.70 - 1.30 mg/dL    Calcium, Total 9.9 8.5 - 10.1 mg/dL    Calculated Osmolality 292 275 - 295 mOsm/kg    eGFR-Cr 78 >=60 mL/min/1.73m2    AST 20 15 - 37 U/L    ALT 30 16 - 61 U/L    Alkaline Phosphatase 101 45 - 117 U/L    Bilirubin, Total 0.4 0.1 - 2.0 mg/dL    Total Protein 7.2 6.4 - 8.2 g/dL    Albumin 3.9 3.4 - 5.0 g/dL    Globulin  3.3 2.8 - 4.4 g/dL    A/G Ratio 1.2 1.0 - 2.0    Patient Fasting for CMP? Yes      *Note: Due to a large number of results and/or encounters for the requested time period, some results have not been displayed. A complete set of results can be found in Results Review.                Passed - In person appointment or virtual visit in the past 6 months     Recent Outpatient Visits              2 months ago Medicare annual wellness visit, initial    Citlaly Echols MD    Office Visit    2 months ago Primary hypertension    Citlaly Echols MD    Office Visit    4 months ago Colon cancer screening    [address], [address], [place], [place]    Office Visit    4 months ago Primary hypertension    Citlaly Echols MD    Office Visit    5 months ago Primary hypertension    Citlaly Echols MD    Office Visit          Future Appointments         Provider Department Appt Notes    In 1 week MD Kevan Dumont [place] EP EE  EP/ EE                      Passed - [facility] or GFRNAA > 50     GFR Evaluation  EGFRCR: 78 , resulted on 8/2/2023               Future Appointments         Provider Department Appt Notes    In 1 week Chema Ortiz MD [address], [address], [place] EP EE  EP/ EE            Recent Outpatient Visits              2 months ago [employer] annual wellness visit, initial    [address], [address], Annie Holder, Sharad Pal MD    Office Visit    2 months ago Primary hypertension    [facility], [address], Alan Greene MD    Office Visit    4 months ago Colon cancer screening    [address], [place], [place]    Office Visit    4 months ago Primary hypertension    [address] Alan Reese MD    Office Visit    5 months ago Primary hypertension    [id number] Alan Reese MD    Office Visit

## 2023-10-12 RX ORDER — AMLODIPINE AND BENAZEPRIL HYDROCHLORIDE 5; 40 MG/1; MG/1
1 CAPSULE ORAL DAILY
Qty: 90 CAPSULE | Refills: 0 | Status: SHIPPED | OUTPATIENT
Start: 2023-10-12

## 2023-10-16 ENCOUNTER — NURSE TRIAGE (OUTPATIENT)
Dept: INTERNAL MEDICINE CLINIC | Facility: CLINIC | Age: 68
End: 2023-10-16

## 2023-10-16 ENCOUNTER — LAB ENCOUNTER (OUTPATIENT)
Dept: LAB | Age: 68
End: 2023-10-16
Attending: INTERNAL MEDICINE
Payer: MEDICARE

## 2023-10-16 DIAGNOSIS — R31.9 HEMATURIA, UNSPECIFIED TYPE: Primary | ICD-10-CM

## 2023-10-16 DIAGNOSIS — R31.9 HEMATURIA, UNSPECIFIED TYPE: ICD-10-CM

## 2023-10-16 LAB
BILIRUB UR QL: NEGATIVE
CLARITY UR: CLEAR
GLUCOSE UR-MCNC: NORMAL MG/DL
HGB UR QL STRIP.AUTO: NEGATIVE
KETONES UR-MCNC: NEGATIVE MG/DL
LEUKOCYTE ESTERASE UR QL STRIP.AUTO: NEGATIVE
NITRITE UR QL STRIP.AUTO: NEGATIVE
PH UR: 7 [PH] (ref 5–8)
PROT UR-MCNC: NEGATIVE MG/DL
SP GR UR STRIP: 1.02 (ref 1–1.03)
UROBILINOGEN UR STRIP-ACNC: NORMAL

## 2023-10-16 PROCEDURE — 87086 URINE CULTURE/COLONY COUNT: CPT

## 2023-10-16 PROCEDURE — 81003 URINALYSIS AUTO W/O SCOPE: CPT

## 2023-10-16 NOTE — TELEPHONE ENCOUNTER
The patient was called and informed with understanding. Appointment made and instructed to push the fluids with understanding. He will go and have the urine test done today.     Future Appointments   Date Time Provider Sanjuana Nena   10/17/2023  1:30 PM Armando Warner MD [facility] AD   10/24/2023 10:45 AM Danielle Monaco MD [address] [place] [address]

## 2023-10-16 NOTE — TELEPHONE ENCOUNTER
Action Requested: Summary for Provider     []  Critical Lab, Recommendations Needed  [x] Need Additional Advice  []   FYI    []   Need Orders  [] Need Medications Sent to Pharmacy  []  Other     SUMMARY: Per protocol, patient should be seen in office today. RN offered appointment with alternate provider and he would prefer to see Dr. Valery Guy.     Reason for call: Hematuria (/)  Onset: Data Unavailable    Spoke to patient. He gets up a lot through the night due to prostate issues and noticed when he urinated last night and shook urine off penis, a bright red drop or two of blood came off. He denies any injury or trauma to penis/meatus. He said that he urinated other times and there was no blood. It has happened another time or two where there seemed to be a little blood in urine. He denies copious amounts of blood. He denies any UTI symptoms. He is concerned and would like to see Dr. Valery Guy or get recommendations.      Reason for Disposition   Patient wants to be seen    Protocols used: Urine - Blood In-A-OH

## 2023-10-16 NOTE — TELEPHONE ENCOUNTER
No more openings today; ok to add him tomorrow at 1:30pm;  we can him do ua and urine culture today.

## 2023-10-17 ENCOUNTER — OFFICE VISIT (OUTPATIENT)
Dept: INTERNAL MEDICINE CLINIC | Facility: CLINIC | Age: 68
End: 2023-10-17
Payer: MEDICARE

## 2023-10-17 VITALS
DIASTOLIC BLOOD PRESSURE: 84 MMHG | HEIGHT: 67 IN | HEART RATE: 112 BPM | OXYGEN SATURATION: 95 % | WEIGHT: 212 LBS | BODY MASS INDEX: 33.27 KG/M2 | SYSTOLIC BLOOD PRESSURE: 146 MMHG | TEMPERATURE: 99 F

## 2023-10-17 DIAGNOSIS — I10 PRIMARY HYPERTENSION: ICD-10-CM

## 2023-10-17 DIAGNOSIS — J06.9 URI, ACUTE: ICD-10-CM

## 2023-10-17 DIAGNOSIS — R31.0 GROSS HEMATURIA: Primary | ICD-10-CM

## 2023-10-17 PROCEDURE — 99214 OFFICE O/P EST MOD 30 MIN: CPT | Performed by: INTERNAL MEDICINE

## 2023-10-17 NOTE — PROGRESS NOTES
Subjective:     Patient ID: Ignacio Pedersen is a 74year old male. Had have some uri symptoms about 2 weeks ago;  had been tested for covid last week and was negative  pt had been taking advil. Then had episode of some blood in urine in toilet bowl about 2 days ago. Hematuria  This is a new problem. The current episode started in the past 7 days. The problem has been waxing and waning since onset. He describes the hematuria as gross hematuria. He reports no clotting in his urine stream. His pain is at a severity of 0/10. He is experiencing no pain. Irritative symptoms include frequency and urgency. Obstructive symptoms include straining. Pertinent negatives include no abdominal pain, bladder pain, dysuria, fever, flank pain, genital pain, nausea, urinary retention or vomiting. His past medical history is significant for hypertension. There is no history of  trauma, kidney stones or tobacco use. History/Other:   Review of Systems   Constitutional: Negative. Negative for fever. HENT:  Positive for congestion. Respiratory:  Positive for cough. Negative for shortness of breath and wheezing. Cardiovascular: Negative. Gastrointestinal: Negative. Negative for abdominal pain, anal bleeding, blood in stool, nausea and vomiting. Genitourinary:  Positive for frequency, hematuria and urgency. Negative for dysuria, flank pain, penile discharge, penile pain, penile swelling and scrotal swelling. Hesitancy also noted   Hematological: Negative. Current Outpatient Medications   Medication Sig Dispense Refill    amLODIPine Besy-Benazepril HCl 5-40 MG Oral Cap Take 1 capsule by mouth daily.  90 capsule 0    Na Sulfate-K Sulfate-Mg Sulf (SUPREP BOWEL PREP KIT) 17.5-3.13-1.6 GM/177ML Oral Solution Take as directed (Patient not taking: Reported on 7/19/2023) 1 each 0     Allergies:No Known Allergies    Past Medical History:   Diagnosis Date    Bell's palsy     Essential hypertension       Past Surgical History:   Procedure Laterality Date    COLONOSCOPY      EYE SERVICE OR PROCEDURE Right 08/03/2022    excision of papilloma near right lateral canthus (right upper). - [facility]      Family History   Problem Relation Age of Onset    Stroke Mother     Heart Disorder Mother     Diabetes Neg     Glaucoma Neg     Macular degeneration Neg       Social History:   Social History     Socioeconomic History    Marital status:    Tobacco Use    Smoking status: Former     Types: Cigars     Passive exposure: Past    Smokeless tobacco: Never    Tobacco comments:     quit smoking more than 30 yrs   Vaping Use    Vaping Use: Never used   Substance and Sexual Activity    Alcohol use: Not Currently     Comment: I do not have a drink every week. Drug use: No   Other Topics Concern    Caffeine Concern Yes     Comment: Coffee, 2 cups daily. Objective:   Physical Exam  Constitutional:       General: He is not in acute distress. Appearance: He is well-developed. He is not ill-appearing, toxic-appearing or diaphoretic. HENT:      Head: Normocephalic and atraumatic. Right Ear: Tympanic membrane, ear canal and external ear normal.      Left Ear: Tympanic membrane, ear canal and external ear normal.      Nose: Nose normal.      Mouth/Throat:      Pharynx: No oropharyngeal exudate. Eyes:      General: No scleral icterus. Right eye: No discharge. Left eye: No discharge. Conjunctiva/sclera: Conjunctivae normal.      Pupils: Pupils are equal, round, and reactive to light. Neck:      Thyroid: No thyromegaly. Vascular: No JVD. Cardiovascular:      Rate and Rhythm: Normal rate and regular rhythm. Pulses: Normal pulses. Heart sounds: Normal heart sounds. No murmur heard. No friction rub. No gallop. Pulmonary:      Effort: Pulmonary effort is normal. No respiratory distress. Breath sounds: Normal breath sounds. No wheezing or rales.    Abdominal:      General: Abdomen is flat. Bowel sounds are normal. There is no distension. Palpations: Abdomen is soft. There is no mass. Tenderness: There is no abdominal tenderness. There is no guarding or rebound. Hernia: There is no hernia in the left inguinal area or right inguinal area. Genitourinary:     Pubic Area: No rash. Penis: Normal and uncircumcised. No hypospadias, erythema, tenderness, discharge, swelling or lesions. Testes: Normal.      Epididymis:      Right: Normal.      Left: Normal.      Rectum: Normal.   Musculoskeletal:         General: Normal range of motion. Cervical back: Normal range of motion and neck supple. No rigidity or tenderness. Right lower leg: No edema. Left lower leg: No edema. Lymphadenopathy:      Cervical: No cervical adenopathy. Lower Body: No right inguinal adenopathy. No left inguinal adenopathy. Skin:     General: Skin is warm and dry. Coloration: Skin is not jaundiced or pale. Findings: No rash. Neurological:      Mental Status: He is alert and oriented to person, place, and time. Psychiatric:         Mood and Affect: Mood normal.         Assessment & Plan:   (R31.0) Gross hematuria  (primary encounter diagnosis)  Plan: CT UROGRAM (W+WO) (GFA=[id number]), Urology Referral        - [facility])        D/w pt; has recurrent gross hematuria though ua and culture negative ;will proceed with ct urogram. Pt also to see urologist and will need cystoscopy after ct urogram done. Referral given. (I10) Primary hypertension  Plan: bp at home improving so he wants to continue same bp meds for now.     (J06.9) URI, acute  Plan: already improving and going on for about a week now. Call back if persist/wrosens. No orders of the defined types were placed in this encounter.       Meds This Visit:  Requested Prescriptions      No prescriptions requested or ordered in this encounter       Imaging & Referrals:  None

## 2023-10-24 ENCOUNTER — OFFICE VISIT (OUTPATIENT)
Dept: OPHTHALMOLOGY | Facility: CLINIC | Age: 68
End: 2023-10-24

## 2023-10-24 DIAGNOSIS — Z53.21 PATIENT LEFT WITHOUT BEING SEEN: Primary | ICD-10-CM

## 2023-10-24 NOTE — ASSESSMENT & PLAN NOTE
Pt left without being seen. Pt thought he was following up for an excision he had in August of 2022 and did not want to be dilated today.  Pt RS to 11/9/23 @ 1:45pm with [facility] and told him it was going to be a dilated eye exam.

## 2023-10-24 NOTE — PROGRESS NOTES
Benjamin Urrutia is a 74year old male. HPI:     Patient History:  Past Medical History:   Diagnosis Date    Bell's palsy     Essential hypertension        Surgical History: Benjamin Urrutia has a past surgical history that includes colonoscopy and eye service or procedure (Right, 08/03/2022) (excision of papilloma near right lateral canthus (right upper). - [facility]). Family History   Problem Relation Age of Onset    Stroke Mother     Heart Disorder Mother     Diabetes Neg     Glaucoma Neg     Macular degeneration Neg        Social History:   Social History     Socioeconomic History    Marital status:    Tobacco Use    Smoking status: Former     Types: Cigars     Passive exposure: Past    Smokeless tobacco: Never    Tobacco comments:     quit smoking more than 30 yrs   Vaping Use    Vaping Use: Never used   Substance and Sexual Activity    Alcohol use: Not Currently     Comment: I do not have a drink every week. Drug use: No   Other Topics Concern    Caffeine Concern Yes     Comment: Coffee, 2 cups daily. Medications:  Current Outpatient Medications   Medication Sig Dispense Refill    amLODIPine Besy-Benazepril HCl 5-40 MG Oral Cap Take 1 capsule by mouth daily. 90 capsule 0       Allergies:  No Known Allergies    ROS:       PHYSICAL EXAM:   Not recorded          ASSESSMENT/PLAN:     Diagnoses and Plan:     Patient left without being seen  Pt left without being seen. Pt thought he was following up for an excision he had in August of 2022 and did not want to be dilated today. Pt RS to 11/9/23 @ 1:45pm with [facility] and told him it was going to be a dilated eye exam.     No orders of the defined types were placed in this encounter. Meds This Visit:  Requested Prescriptions      No prescriptions requested or ordered in this encounter        Follow up instructions:  No follow-ups on file.     10/24/2023  Scribed by: Susanne Melton MD

## 2023-11-19 ENCOUNTER — TELEPHONE (OUTPATIENT)
Dept: INTERNAL MEDICINE CLINIC | Facility: CLINIC | Age: 68
End: 2023-11-19

## 2023-11-19 NOTE — TELEPHONE ENCOUNTER
Pls call and remind pt to do his ct urogram for eval of his hematuria and also see urologist as had been advised

## 2023-11-20 NOTE — TELEPHONE ENCOUNTER
Advised patient of Dr Gabe Waters note. Patient had scheduled his CT urogram but it was two weeks out. Patient states that symptoms stopped 2 days after he saw Dr. Drummond Late so he canceled his appointment to have the CT done. He states if symptoms return he will schedule. Denies any symptoms today and feels well. Routing as MALLORIE.

## 2023-11-20 NOTE — TELEPHONE ENCOUNTER
He still needs to do the ct urogram even though his hematuria had stopped. We stil need to find any possible cause of his hematuria such as malignancy of urinary tract which can also intermittently bleed.

## 2024-01-05 RX ORDER — AMLODIPINE AND BENAZEPRIL HYDROCHLORIDE 5; 40 MG/1; MG/1
1 CAPSULE ORAL DAILY
Qty: 90 CAPSULE | Refills: 1 | Status: SHIPPED | OUTPATIENT
Start: 2024-01-05

## 2024-01-05 NOTE — TELEPHONE ENCOUNTER
Protocol Failed/ No Protocol    Requested Prescriptions   Pending Prescriptions Disp Refills    AMLODIPINE BESY-BENAZEPRIL HCL 5-40 MG Oral Cap [Pharmacy Med Name: AMLODIPINE-BENAZ 5/40MG CAPSULES] 90 capsule 0     Sig: Take 1 capsule by mouth daily.       Hypertensive Medications Protocol Failed - 1/3/2024  4:31 PM        Failed - Last BP reading less than 140/90     BP Readings from Last 1 Encounters:   10/17/23 146/84               Passed - In person appointment in the past 12 or next 3 months     Recent Outpatient Visits              2 months ago Patient left without being seen    [facility] [place], Morris Toro MD    Office Visit    2 months ago Gross hematuria    [facility], Ruddy Polanco MD    Office Visit    5 months ago Medicare annual wellness visit, initial    [facility], Ruddy Polanco MD    Office Visit    5 months ago Primary hypertension    [facility], Ruddy Polanco MD    Office Visit    7 months ago Colon cancer screening    [facility] [place], Erika Manjarrez PA-C    Office Visit                      Passed - CMP or BMP in past 6 months     Recent Results (from the past 4392 hour(s))   Comp Metabolic Panel (14)    Collection Time: 08/02/23  9:09 AM   Result Value Ref Range    Glucose 109 (H) 70 - 99 mg/dL    Sodium 139 136 - 145 mmol/L    Potassium 4.8 3.5 - 5.1 mmol/L    Chloride 108 98 - 112 mmol/L    CO2 27.0 21.0 - 32.0 mmol/L    Anion Gap 4 0 - 18 mmol/L    BUN 21 (H) 7 - 18 mg/dL    Creatinine 1.06 0.70 - 1.30 mg/dL    Calcium, Total 9.9 8.5 - 10.1 mg/dL    Calculated Osmolality 292 275 - 295 mOsm/kg    eGFR-Cr 78 >=60 mL/min/1.73m2    AST 20 15 - 37 U/L    ALT 30 16 - 61 U/L    Alkaline Phosphatase 101 45 - 117 U/L    Bilirubin, Total 0.4 0.1 - 2.0 mg/dL    Total Protein 7.2  6.4 - 8.2 g/dL    Albumin 3.9 3.4 - 5.0 g/dL    Globulin  3.3 2.8 - 4.4 g/dL    A/G Ratio 1.2 1.0 - 2.0    Patient Fasting for CMP? Yes      *Note: Due to a large number of results and/or encounters for the requested time period, some results have not been displayed. A complete set of results can be found in Results Review.               Passed - In person appointment or virtual visit in the past 6 months     Recent Out[facility] Visits              2 months [facility]    [facility]    [facility]    5 months ago Medicare annual wellness visit, initial    [facility]    [facility]    5 months ago [facility] hypertension    [facility]    [facility]    7 months ago [facility] cancer screening    [facility]    [facility] Visit                      Passed - EGFRCR or GFRNAA > 50     GFR Evaluation  EGFRCR: 78 , resulted on 8/2/2023                 Recent Outpatient Visits              2 months ago Patient left without being seen    [facility]    [facility]    2 months ago Gross hematuria    [facility]    [facility]    5 months ago Medicare annual wellness visit, initial    [facility]    [facility]    5 months ago Primary hypertension    [facility]    [facility]    7 months ago [facility] cancer screening    [facility]    Office Visit

## 2024-04-18 ENCOUNTER — APPOINTMENT (OUTPATIENT)
Dept: HEMATOLOGY/ONCOLOGY | Facility: HOSPITAL | Age: 69
End: 2024-04-18
Attending: INTERNAL MEDICINE
Payer: MEDICARE

## 2024-05-30 ENCOUNTER — OFFICE VISIT (OUTPATIENT)
Dept: OTOLARYNGOLOGY | Facility: CLINIC | Age: 69
End: 2024-05-30

## 2024-05-30 DIAGNOSIS — H61.23 BILATERAL IMPACTED CERUMEN: Primary | ICD-10-CM

## 2024-05-30 PROCEDURE — 69210 REMOVE IMPACTED EAR WAX UNI: CPT | Performed by: OTOLARYNGOLOGY

## 2024-05-30 NOTE — PROGRESS NOTES
Benigno Mcneill is a 68 year old male.    Chief Complaint   Patient presents with    Ear Wax     Ear cleaning        HISTORY OF PRESENT ILLNESS    Patient presents for cerumen removal. No other complaints or concerns at this time    Social History     Socioeconomic History    Marital status:    Tobacco Use    Smoking status: Former     Types: Cigars     Passive exposure: Past    Smokeless tobacco: Never    Tobacco comments:     quit smoking more than 30 yrs   Vaping Use    Vaping status: Never Used   Substance and Sexual Activity    Alcohol use: Not Currently     Comment: I do not have a drink every week.    Drug use: No   Other Topics Concern    Caffeine Concern Yes     Comment: Coffee, 2 cups daily.       Family History   Problem Relation Age of Onset    Stroke Mother     Heart Disorder Mother     Diabetes Neg     Glaucoma Neg     Macular degeneration Neg        Past Medical History:    Bell's palsy    Essential hypertension       Past Surgical History:   Procedure Laterality Date    Colonoscopy      Eye service or procedure Right 08/03/2022    excision of papilloma near right lateral canthus (right upper). - [facility]       REVIEW OF SYSTEMS    System Neg/Pos Details   Constitutional Negative Fatigue, fever and weight loss.   ENMT Negative Drooling.   Eyes Negative Blurred vision and vision changes.   Respiratory Negative Dyspnea and wheezing.   Cardio Negative Chest pain, irregular heartbeat/palpitations and syncope.   GI Negative Abdominal pain and diarrhea.   Endocrine Negative Cold intolerance and heat intolerance.   Neuro Negative Tremors.   Psych Negative Anxiety and depression.   Integumentary Negative Frequent skin infections, pigment change and rash.   Hema/Lymph Negative Easy bleeding and easy bruising.           PHYSICAL EXAM    There were no vitals taken for this visit.       Constitutional Normal Overall appearance - Normal.        Neck Exam Normal Inspection - Normal. Palpation - Normal. Parotid  gland - Normal. Thyroid gland - Normal.             Head/Face Normal Facial features - Normal. Eyebrows - Normal. Skull - Normal.             Ears Normal Inspection - Right: Normal, Left: Normal. Canal - Right: Normal, Left: Normal. TM - Right: Normal, Left: Normal.   Skin Normal Inspection - Normal.                              Canals:  Right: Canal reveals cerumen impaction,   Left: Canal reveals cerumen impaction,     Tympanic Membranes:  Right: Normal tympanic membrane.   Left: Normal tympanic membrane.     TM Visualized Method:   Right TM examined via otomicroscopy.    Left TM examined via otomicroscopy.      PROCEDURE:    Removal of cerumen impaction   The cerumen impaction was completely removed using microscopy.   Removal was completed by using acurette and/or suction.   Comments: Return to clinic as needed.  Avoid q-tips, water precautions and use over the counter wax remedies as needed.      Current Outpatient Medications:     amLODIPine Besy-Benazepril HCl 5-40 MG Oral Cap, Take 1 capsule by mouth daily., Disp: 90 capsule, Rfl: 1  ASSESSMENT AND PLAN    1. Bilateral impacted cerumen        All cerumen was removed using microscopy. I have asked the patient to return to see me as needed for repeat cerumen removal in the future.      Willard Bullock MD    5/30/2024    3:07 PM

## 2024-06-12 ENCOUNTER — OFFICE VISIT (OUTPATIENT)
Dept: INTERNAL MEDICINE CLINIC | Facility: CLINIC | Age: 69
End: 2024-06-12
Payer: MEDICARE

## 2024-06-12 VITALS
OXYGEN SATURATION: 97 % | BODY MASS INDEX: 35.22 KG/M2 | TEMPERATURE: 99 F | DIASTOLIC BLOOD PRESSURE: 96 MMHG | SYSTOLIC BLOOD PRESSURE: 166 MMHG | HEIGHT: 67 IN | WEIGHT: 224.38 LBS | HEART RATE: 96 BPM

## 2024-06-12 DIAGNOSIS — Z12.11 COLON CANCER SCREENING: ICD-10-CM

## 2024-06-12 DIAGNOSIS — R73.01 IFG (IMPAIRED FASTING GLUCOSE): ICD-10-CM

## 2024-06-12 DIAGNOSIS — R31.0 GROSS HEMATURIA: ICD-10-CM

## 2024-06-12 DIAGNOSIS — R97.20 ELEVATED PSA: ICD-10-CM

## 2024-06-12 DIAGNOSIS — E78.00 HYPERCHOLESTEREMIA: ICD-10-CM

## 2024-06-12 DIAGNOSIS — I10 PRIMARY HYPERTENSION: Primary | ICD-10-CM

## 2024-06-12 PROCEDURE — 99214 OFFICE O/P EST MOD 30 MIN: CPT | Performed by: INTERNAL MEDICINE

## 2024-06-12 RX ORDER — AMLODIPINE AND BENAZEPRIL HYDROCHLORIDE 5; 40 MG/1; MG/1
1 CAPSULE ORAL DAILY
Qty: 90 CAPSULE | Refills: 1 | Status: SHIPPED | OUTPATIENT
Start: 2024-06-12

## 2024-06-12 NOTE — PROGRESS NOTES
Subjective:     Patient ID: Benigno Mcneill is a 68 year old male.    Hypertension  This is a chronic problem. The current episode started more than 1 year ago. The problem has been gradually worsening since onset. The problem is uncontrolled. Pertinent negatives include no chest pain, headaches, palpitations, peripheral edema or shortness of breath. There are no associated agents to hypertension. Risk factors for coronary artery disease include dyslipidemia, obesity and male gender. Past treatments include ACE inhibitors, calcium channel blockers and lifestyle changes. The current treatment provides no improvement. There are no compliance problems.  There is no history of angina, kidney disease, CAD/MI, CVA, heart failure or PVD. There is no history of chronic renal disease, a hypertension causing med or a thyroid problem.   Hyperlipidemia  This is a chronic problem. The current episode started more than 1 year ago. The problem is uncontrolled. Recent lipid tests were reviewed and are high. Exacerbating diseases include obesity. He has no history of chronic renal disease, diabetes, hypothyroidism or liver disease. There are no known factors aggravating his hyperlipidemia. Pertinent negatives include no chest pain or shortness of breath. Current antihyperlipidemic treatment includes exercise and diet change. The current treatment provides mild improvement of lipids. There are no compliance problems.  Risk factors for coronary artery disease include dyslipidemia, hypertension, male sex and obesity.       History/Other:   Review of Systems   Constitutional: Negative.    Eyes: Negative.    Respiratory: Negative.  Negative for cough, shortness of breath and wheezing.    Cardiovascular: Negative.  Negative for chest pain, palpitations and leg swelling.   Gastrointestinal: Negative.  Negative for anal bleeding, blood in stool, constipation, diarrhea and vomiting.   Genitourinary: Negative.  Negative for dysuria, frequency  and hematuria.        + nocturia   Neurological:  Negative for dizziness, speech difficulty, weakness, numbness and headaches.     Current Outpatient Medications   Medication Sig Dispense Refill    amLODIPine Besy-Benazepril HCl 5-40 MG Oral Cap Take 1 capsule by mouth daily. 90 capsule 1     Allergies:No Known Allergies    Past Medical History:    Bell's palsy    Essential hypertension    Hyperlipidemia    Obesity      Past Surgical History:   Procedure Laterality Date    Colonoscopy      Eye service or procedure Right 08/03/2022    excision of papilloma near right lateral canthus (right upper). - [facility]      Family History   Problem Relation Age of Onset    Stroke Mother     Heart Disorder Mother     Diabetes Neg     Glaucoma Neg     Macular degeneration Neg       Social History:   Social History     Socioeconomic History    Marital status:    Tobacco Use    Smoking status: Former     Types: Cigars     Passive exposure: Past    Smokeless tobacco: Never    Tobacco comments:     quit smoking more than 30 yrs   Vaping Use    Vaping status: Never Used   Substance and Sexual Activity    Alcohol use: Not Currently     Comment: I do not have a drink every week.    Drug use: No   Other Topics Concern    Caffeine Concern Yes     Comment: Coffee, 2 cups daily.        Objective:   Physical Exam  Constitutional:       General: He is not in acute distress.     Appearance: He is well-developed. He is obese. He is not ill-appearing, toxic-appearing or diaphoretic.   HENT:      Head: Normocephalic and atraumatic.      Right Ear: External ear normal.      Left Ear: External ear normal.      Nose: Nose normal.      Mouth/Throat:      Pharynx: No oropharyngeal exudate.   Eyes:      General: No scleral icterus.        Right eye: No discharge.         Left eye: No discharge.      Conjunctiva/sclera: Conjunctivae normal.      Pupils: Pupils are equal, round, and reactive to light.   Neck:      Vascular: No carotid bruit or JVD.    Cardiovascular:      Rate and Rhythm: Normal rate and regular rhythm.      Heart sounds: Normal heart sounds. No murmur heard.  Pulmonary:      Effort: Pulmonary effort is normal. No respiratory distress.      Breath sounds: Normal breath sounds. No wheezing or rales.   Abdominal:      General: Bowel sounds are normal. There is no distension.      Palpations: Abdomen is soft. There is no mass.      Tenderness: There is no abdominal tenderness. There is no guarding or rebound.   Musculoskeletal:         General: No tenderness. Normal range of motion.      Cervical back: Normal range of motion and neck supple. No rigidity or tenderness.      Right lower leg: No edema.      Left lower leg: No edema.   Lymphadenopathy:      Cervical: No cervical adenopathy.   Skin:     General: Skin is warm and dry.      Coloration: Skin is not jaundiced or pale.      Findings: No rash.   Neurological:      Mental Status: He is alert and oriented to person, place, and time.         Assessment & Plan:   (I10) Primary hypertension  (primary encounter diagnosis)  Plan: bp elevated to stage 2 ; pt states he is taking his bp meds as prescribed; had gained 12 lbs since his last visit and he blames this for his elevation of his blood pressure beyond his baseline.  I told him that we should at least try adding a mild water pill to help bring down the blood pressure to lower risk of strokes or even heart failure or other complications of uncontrolled hypertension.  Also told him about the doing a workup for secondary cause of hypertension which he declined.  He said that he would want to work on his weight to get it down and hoping that this will bring down the blood pressure.  I told him that he is taking risks with this stage II hypertension that he has.  He is still states that he wants to continue the same blood pressure medicine for now and does understand the risk of.  He will be back in 6 weeks for his annual physical and he would like  to wait till then to make any decision as far as adding more blood pressure medicine.    (R31.0) Gross hematuria  Plan: Patient had episode of gross hematuria last year and at that time was advised to get a CT urogram and also see his urologist for cystoscopy both of which she never did.  He has not had any recurrence since then.  I still told him that he should do a workup as advised before since we do not really know the cause of her hematuria before and the last we want do not want to miss his any tumors or malignancy of the  tract.  Patient states that he will think about this first before she would proceed with any of these procedures as mentioned.    (R73.01) IFG (impaired fasting glucose)  Plan: pt to congtinue to watch and cut down on his carbs in diet.     (E78.00) Hypercholesteremia  Plan: had been ff low fat low chol diet, he will have his annual physical in 2 mos and will check lipid panel then.     (R97.20) Elevated PSA  Plan: had been chronically elevated and had been advised before to ffup with urologist.     (Z12.11) Colon cancer screening  Plan: pt had been scheduled for colonoscopy before but cancelled since he states unable to tolerate colon prep.        No orders of the defined types were placed in this encounter.      Meds This Visit:  Requested Prescriptions      No prescriptions requested or ordered in this encounter       Imaging & Referrals:  None

## 2024-07-01 RX ORDER — AMLODIPINE AND BENAZEPRIL HYDROCHLORIDE 5; 40 MG/1; MG/1
1 CAPSULE ORAL DAILY
Qty: 90 CAPSULE | Refills: 1 | OUTPATIENT
Start: 2024-07-01

## 2024-09-30 ENCOUNTER — NURSE TRIAGE (OUTPATIENT)
Dept: INTERNAL MEDICINE CLINIC | Facility: CLINIC | Age: 69
End: 2024-09-30

## 2024-09-30 NOTE — TELEPHONE ENCOUNTER
Action Requested: Summary for Provider     []  Critical Lab, Recommendations Needed  [] Need Additional Advice  []   FYI    []   Need Orders  [] Need Medications Sent to Pharmacy  []  Other     SUMMARY: Disposition per protocol  is to home care.  Patient will consider getting a home covid test and call back if Positive.      Reason for call: Cough/URI  Onset: Saturday.      Cold symptoms started Saturday. Runny nose, post nasal drip, intermittent cough. Afebrile. No known covid exposure, history of seasonal allergies.   He has been wearing mask at home when around wife, and washing hands frequently. Reviewed care advice to consider covid  testing, over the counter cough drops, cough syrup, push fluids, breathe steam, call back for positive Covid test or increasing cough, chest  pain, shortness of breath or fever. Patient verbalizes understanding and agrees to plan of care.    Reason for Disposition   COVID-19 infection suspected by caller or triager and mild symptoms (cough, fever, or others) and has not gotten tested yet    Protocols used: Coronavirus (COVID-19) Diagnosed or [id number]

## 2024-12-19 RX ORDER — AMLODIPINE AND BENAZEPRIL HYDROCHLORIDE 5; 40 MG/1; MG/1
1 CAPSULE ORAL DAILY
Qty: 90 CAPSULE | Refills: 1 | Status: CANCELLED | OUTPATIENT
Start: 2024-12-19

## 2024-12-19 RX ORDER — AMLODIPINE AND BENAZEPRIL HYDROCHLORIDE 5; 40 MG/1; MG/1
1 CAPSULE ORAL DAILY
Qty: 90 CAPSULE | Refills: 0 | Status: SHIPPED | OUTPATIENT
Start: 2024-12-19

## 2024-12-19 NOTE — TELEPHONE ENCOUNTER
Patient is almost out of the amlodipine/benazepril. Please advise.     Patient also scheduled a follow up appointment with Dr Cabrera for 1/27/2025.

## 2024-12-19 NOTE — TELEPHONE ENCOUNTER
Please Review. Protocol Failed; No Protocol   BP Readings from Last 1 Encounters:   06/12/24 (!) 166/96   Patient is out of medication please advise  Future Appointments   Date Time Provider Department Center   1/27/2025  1:00 PM Ruddy Cabrera MD [facility]       Requested Prescriptions   Pending Prescriptions Disp Refills    amLODIPine Besy-Benazepril HCl 5-40 MG Oral Cap [Pharmacy Med Name: AMLODIPINE-BENAZ 5/40MG CAPSULES] 90 capsule 3     Sig: TAKE 1 CAPSULE BY MOUTH DAILY       Hypertension Medications Protocol Failed - 12/19/2024 10:16 AM        Failed - CMP or BMP in past 12 months        Failed - Last BP reading less than 140/90     BP Readings from Last 1 Encounters:   06/12/24 (!) 166/96               Failed - EGFRCR or GFRNAA > 50     GFR Evaluation            Passed - In person appointment or virtual visit in the past 12 mos or appointment in next 3 mos     Recent Outpatient Visits              6 months ago Primary hypertension    [facility] Ruddy Cabrera MD    Office Visit    6 months ago Bilateral impacted cerumen    [facility], Willard Raines MD    Office Visit    1 year ago Patient left without being seen    [facility], [place] Morris Hurtado MD    Office Visit    1 year ago Gross hematuria    [facility] Ruddy Cabrera MD    Office Visit    1 year ago Medicare annual wellness visit, initial    [facility] Ruddy Cabrera MD    Office Visit          Future Appointments         Provider Department Appt Notes    In 1 month Ruddy Cabrera MD [facility] f/u                           Future Appointments         Provider Department Appt Notes    In 1 month Ruddy Cabrera MD [facility]  f/u          Recent Outpatient Visits              6 months ago Primary hypertension    [facility], [address], Ruddy Polanco MD    Office Visit    6 months ago Bilateral impacted cerumen    [facility], Willard Raines MD    Office Visit    1 year ago Patient left without being seen    [facility], Morris Toor MD    Office Visit    1 year ago Gross hematuria    [facility], Ruddy Polanco MD    Office Visit    1 year ago Medicare annual wellness visit, initial    [facility], Ruddy Polanco MD    Office Visit

## 2025-01-27 ENCOUNTER — OFFICE VISIT (OUTPATIENT)
Dept: INTERNAL MEDICINE CLINIC | Facility: CLINIC | Age: 70
End: 2025-01-27

## 2025-01-27 VITALS
HEART RATE: 106 BPM | DIASTOLIC BLOOD PRESSURE: 94 MMHG | WEIGHT: 240.25 LBS | BODY MASS INDEX: 38 KG/M2 | SYSTOLIC BLOOD PRESSURE: 158 MMHG

## 2025-01-27 DIAGNOSIS — R73.01 IFG (IMPAIRED FASTING GLUCOSE): ICD-10-CM

## 2025-01-27 DIAGNOSIS — R31.0 GROSS HEMATURIA: ICD-10-CM

## 2025-01-27 DIAGNOSIS — I10 PRIMARY HYPERTENSION: Primary | ICD-10-CM

## 2025-01-27 DIAGNOSIS — E78.00 HYPERCHOLESTEREMIA: ICD-10-CM

## 2025-01-27 DIAGNOSIS — R97.20 ELEVATED PSA: ICD-10-CM

## 2025-01-27 DIAGNOSIS — E66.9 OBESITY (BMI 30-39.9): ICD-10-CM

## 2025-01-27 DIAGNOSIS — Z12.11 COLON CANCER SCREENING: ICD-10-CM

## 2025-01-27 DIAGNOSIS — D69.6 THROMBOCYTOPENIA: ICD-10-CM

## 2025-01-27 PROCEDURE — 99214 OFFICE O/P EST MOD 30 MIN: CPT | Performed by: INTERNAL MEDICINE

## 2025-01-27 NOTE — PROGRESS NOTES
Subjective:     Patient ID: Benigno Mcneill is a 69 year old male.    Hypertension  This is a chronic problem. The current episode started more than 1 year ago. The problem is unchanged. The problem is uncontrolled. Pertinent negatives include no chest pain, palpitations, peripheral edema or shortness of breath. There are no associated agents to hypertension. Risk factors for coronary artery disease include dyslipidemia, male gender and obesity. Past treatments include calcium channel blockers, ACE inhibitors and lifestyle changes. The current treatment provides mild improvement. There is no history of angina, CAD/MI, CVA, heart failure or PVD. There is no history of chronic renal disease, a hypertension causing med or a thyroid problem.   Hyperlipidemia  This is a chronic problem. The current episode started more than 1 year ago. The problem is uncontrolled. Recent lipid tests were reviewed and are high. Exacerbating diseases include obesity. He has no history of chronic renal disease, diabetes or hypothyroidism. Pertinent negatives include no chest pain or shortness of breath. Current antihyperlipidemic treatment includes diet change and exercise. There are no compliance problems.  Risk factors for coronary artery disease include dyslipidemia, hypertension, male sex and obesity.       History/Other:   Review of Systems   Constitutional: Negative.    Respiratory:  Positive for choking. Negative for shortness of breath.    Cardiovascular:  Negative for chest pain and palpitations.   Gastrointestinal: Negative.    Genitourinary: Negative.      Current Outpatient Medications   Medication Sig Dispense Refill    amLODIPine Besy-Benazepril HCl 5-40 MG Oral Cap Take 1 capsule by mouth daily. 90 capsule 0     Allergies:Allergies[1]    Past Medical History:    Bell's palsy    Essential hypertension    Hyperlipidemia    Obesity      Past Surgical History:   Procedure Laterality Date    Colonoscopy      Eye service or  procedure Right 08/03/2022    excision of papilloma near right lateral canthus (right upper). - [facility]      Family History   Problem Relation Age of Onset    Stroke Mother     Heart Disorder Mother     Diabetes Neg     Glaucoma Neg     Macular degeneration Neg       Social History:   Social History     Socioeconomic History    Marital status:    Tobacco Use    Smoking status: Former     Types: Cigars     Passive exposure: Past    Smokeless tobacco: Never    Tobacco comments:     quit smoking more than 30 yrs   Vaping Use    Vaping status: Never Used   Substance and Sexual Activity    Alcohol use: Not Currently     Comment: I do not have a drink every week.    Drug use: No   Other Topics Concern    Caffeine Concern Yes     Comment: Coffee, 2 cups daily.        Objective:   Physical Exam  Constitutional:       General: He is not in acute distress.     Appearance: He is not ill-appearing, toxic-appearing or diaphoretic.   HENT:      Right Ear: Tympanic membrane, ear canal and external ear normal.      Left Ear: Tympanic membrane, ear canal and external ear normal.   Eyes:      General: No scleral icterus.        Right eye: No discharge.         Left eye: No discharge.   Neck:      Vascular: No carotid bruit.   Cardiovascular:      Rate and Rhythm: Normal rate and regular rhythm.      Heart sounds: Normal heart sounds. No murmur heard.     No gallop.   Pulmonary:      Effort: Pulmonary effort is normal. No respiratory distress.      Breath sounds: Normal breath sounds. No wheezing or rales.   Abdominal:      General: Bowel sounds are normal. There is no distension.      Palpations: Abdomen is soft. There is no mass.      Tenderness: There is no abdominal tenderness. There is no guarding or rebound.   Musculoskeletal:      Cervical back: Normal range of motion and neck supple. No rigidity or tenderness.      Right lower leg: No edema.      Left lower leg: No edema.   Lymphadenopathy:      Cervical: No cervical  adenopathy.   Skin:     Coloration: Skin is not jaundiced or pale.   Neurological:      Mental Status: He is alert.         Assessment & Plan:   (I10) Primary hypertension  (primary encounter diagnosis)  Plan: CBC With Differential With Platelet        Pt's bp remains elevated and d/w pt again risk of complications of uncontrolled hypertension. He already had seen 2 cardiologist before but meds given didn't helped his bp.   I told him would recommend going to tertiary center like [facility] or [facility] specialist for hypertension to help     (E78.00) Hypercholesteremia  Plan: Comp Metabolic Panel (14), Lipid Panel        Check lipid panel; continue with low chol diet.     (R73.01) IFG (impaired fasting glucose)  Plan: Hemoglobin A1C        Check fbg and A1c.     (R97.20) Elevated PSA  Plan: PSA - Diagnostic [E], Urology Referral - In         Network        Pt has chronically elevated PSA since 2017; had been referred before to  urologist Dr Tovar but pt not happy with his management . I told pt to see another urologist and referral given .    (D69.6) Thrombocytopenia  Plan: chronic and had been evaluated by hematologist Dr Patterson before .    (Z12.11) Colon cancer screening  Plan: pt declines to do colonoscopy that had been advised before.     (R31.0) Gross hematuria  Plan: Urinalysis, Routine [E]        Pt has not seen urologist as advised before when he had episode of gross hematuria; he has not have any recurrence of hematuria but I still hold him importance of urologic workup so as not to miss possible cause as as  malignancy; pt states he doesn't want to have urologic workup and understands the risk of not doing so .    (E66.9) Obesity (BMI 30-39.9)  Plan: [employer] Weight [facility] - Dr. Nadeem Romero [facility] EMMG 9        D/w pt need for him to lose weight which he had been having difficulty with; I recommend seeing our weight clinic and referral given .       No  orders of the defined types were placed in this encounter.      Meds This Visit:  Requested Prescriptions      No prescriptions requested or ordered in this encounter       Imaging & Referrals:  None            [1] No Known Allergies

## 2025-03-25 NOTE — TELEPHONE ENCOUNTER
Please review.  Protocol failed / Has no protocol.     [id number] message sent to patient to complete labs pended from last office visit 1/27/225     Requested Prescriptions   Pending Prescriptions Disp Refills    AMLODIPINE BESY-BENAZEPRIL HCL 5-40 MG Oral Cap [Pharmacy Med Name: AMLODIPINE-BENAZ 5/40MG CAPSULES] 90 capsule 0     Sig: Take 1 capsule by mouth daily.       Hypertension Medications Protocol Failed - 3/25/2025  4:05 PM        Failed - CMP or BMP in past 12 months        Failed - Last BP reading less than 140/90        Failed - EGFRCR or GFRNAA > 50        Passed - In person appointment or virtual visit in the past 12 mos or appointment in next 3 mos        Passed - Medication is active on med list

## 2025-03-26 RX ORDER — AMLODIPINE AND BENAZEPRIL HYDROCHLORIDE 5; 40 MG/1; MG/1
1 CAPSULE ORAL DAILY
Qty: 90 CAPSULE | Refills: 1 | Status: SHIPPED | OUTPATIENT
Start: 2025-03-26

## 2025-05-20 ENCOUNTER — TELEPHONE (OUTPATIENT)
Dept: INTERNAL MEDICINE CLINIC | Facility: CLINIC | Age: 70
End: 2025-05-20

## (undated) NOTE — LETTER
04/16/20    Katherine Maya  [address] [place] [address]      Dear Maryjo Villalobos,    Our records indicate that you have outstanding lab work and or testing that was ordered for you and has not yet been completed:  Orders Placed This Encounter      CBC

## (undated) NOTE — LETTER
[facility], [address] [address]       12/23/19        Patient: Brittnee Calderon   YOB: 1955   Date of Visit: 12/23/2019       Dear  Dr. Delilah Rosas MD,      Thank you for referring Brittnee Calderon to my practice.   Phill p

## (undated) NOTE — LETTER
01/16/20        Beto Siemens  [address] [facility]      Dear Alex Uribeer,    Our records indicate that you have outstanding lab work and or testing that was ordered for you and has not yet been completed:  Orders Placed This Encounter

## (undated) NOTE — LETTER
09/05/18        Hadley aRmos  [address] [facility]      Dear Basil Vallecillo,    Our records indicate that you have outstanding lab work and or testing that was ordered for you and has not yet been completed:          Lipid Panel [E]      Com

## (undated) NOTE — LETTER
AUTHORIZATION FOR SURGICAL OPERATION OR OTHER PROCEDURE    1. I hereby authorize Dr. Joe Payne, and [place] [facility] staff assigned to my case to perform the following operation and/or procedure at the [facility], [facility]:    _______________________________________________________________________________________________      _______________________________________________________________________________________________    2. My physician has explained the nature and purpose of the operation or other procedure, possible alternative methods of treatment, the risks involved, and the possibility of complication to me. I acknowledge that no guarantee has been made as to the result that may be obtained. 3.  I recognize that, during the course of this operation, or other procedure, unforseen conditions may necessitate additional or different procedure than those listed above. I, therefore, further authorize and request that the above named physician, his/her physician assistants or designees perform such procedures as are, in his/her professional opinion, necessary and desirable. 4.  Any tissue or organs removed in the operation or other procedure may be disposed of by and at the discretion of the [place] [facility] [place], [facility] and [facility]. 5.  I understand that in the event of a medical emergency, I will be transported by local paramedics to [facility] or other hospital emergency department. 6.  I certify that I have read and fully understand the above consent to operation and/or other procedure. 7.  I acknowledge that my physician has explained sedation/analgesia administration to me including the risks and benefits. I consent to the administration of sedation/analgesia as may be necessary or desirable in the judgement of my physician. Witness signature: ___________________________________________________ Date:  ______/______/_____                    Time:  ________ A. M.  P.M. Patient Name:  ______________________________________________________  (please print)      Patient signature:  ___________________________________________________             Relationship to Patient:           []  Parent    Responsible person                          []  Spouse  In case of minor or                    [] Other  _____________   Incompetent name:  __________________________________________________                               (please print)      _____________      Responsible person  In case of minor or  Incompetent signature:  _______________________________________________    Statement of Physician  My signature below affirms that prior to the time of the procedure, I have explained to the patient and/or his/her guardian, the risks and benefits involved in the proposed treatment and any reasonable alternative to the proposed treatment. I have also explained the risks and benefits involved in the refusal of the proposed treatment and have answered the patient's questions.                         Date:  ______/______/_______  Provider                      Signature:  __________________________________________________________       Time:  ___________ A.M    P.M.

## (undated) NOTE — LETTER
February 8, 2018         Stephanie Au MD  [address]   [address] [facility] [place]      Patient: Hector Lopez   YOB: 1955   Date of Visit: 2/8/2018       Dear Dr. Merly Mckeon MD,    I saw your patient, Hector Lopez, on 2/8/2018.  Enc